# Patient Record
Sex: MALE | Race: ASIAN | NOT HISPANIC OR LATINO
[De-identification: names, ages, dates, MRNs, and addresses within clinical notes are randomized per-mention and may not be internally consistent; named-entity substitution may affect disease eponyms.]

---

## 2017-05-12 ENCOUNTER — NON-APPOINTMENT (OUTPATIENT)
Age: 72
End: 2017-05-12

## 2017-05-12 ENCOUNTER — APPOINTMENT (OUTPATIENT)
Dept: CARDIOLOGY | Facility: CLINIC | Age: 72
End: 2017-05-12

## 2017-05-12 VITALS
SYSTOLIC BLOOD PRESSURE: 135 MMHG | RESPIRATION RATE: 18 BRPM | HEART RATE: 68 BPM | TEMPERATURE: 98 F | OXYGEN SATURATION: 97 % | WEIGHT: 200 LBS | DIASTOLIC BLOOD PRESSURE: 87 MMHG | BODY MASS INDEX: 31.32 KG/M2

## 2017-05-12 VITALS — DIASTOLIC BLOOD PRESSURE: 90 MMHG | SYSTOLIC BLOOD PRESSURE: 136 MMHG

## 2017-11-20 ENCOUNTER — NON-APPOINTMENT (OUTPATIENT)
Age: 72
End: 2017-11-20

## 2017-11-20 ENCOUNTER — APPOINTMENT (OUTPATIENT)
Dept: CARDIOLOGY | Facility: CLINIC | Age: 72
End: 2017-11-20
Payer: MEDICARE

## 2017-11-20 VITALS
OXYGEN SATURATION: 95 % | WEIGHT: 200 LBS | RESPIRATION RATE: 18 BRPM | HEART RATE: 93 BPM | BODY MASS INDEX: 31.32 KG/M2 | SYSTOLIC BLOOD PRESSURE: 134 MMHG | TEMPERATURE: 98.2 F | DIASTOLIC BLOOD PRESSURE: 78 MMHG

## 2017-11-20 VITALS — SYSTOLIC BLOOD PRESSURE: 127 MMHG | DIASTOLIC BLOOD PRESSURE: 80 MMHG

## 2017-11-20 PROCEDURE — 99214 OFFICE O/P EST MOD 30 MIN: CPT

## 2017-11-20 PROCEDURE — 93000 ELECTROCARDIOGRAM COMPLETE: CPT | Mod: 59

## 2017-11-20 RX ORDER — RAMELTEON 8 MG/1
8 TABLET, FILM COATED ORAL
Qty: 30 | Refills: 0 | Status: ACTIVE | COMMUNITY
Start: 2017-08-08

## 2017-11-20 RX ORDER — ALFUZOSIN HYDROCHLORIDE 10 MG/1
10 TABLET, EXTENDED RELEASE ORAL
Qty: 90 | Refills: 0 | Status: ACTIVE | COMMUNITY
Start: 2017-08-07

## 2018-05-21 ENCOUNTER — APPOINTMENT (OUTPATIENT)
Dept: CARDIOLOGY | Facility: CLINIC | Age: 73
End: 2018-05-21
Payer: MEDICARE

## 2018-05-21 VITALS
SYSTOLIC BLOOD PRESSURE: 124 MMHG | TEMPERATURE: 98.9 F | DIASTOLIC BLOOD PRESSURE: 70 MMHG | OXYGEN SATURATION: 96 % | HEART RATE: 96 BPM | BODY MASS INDEX: 30.07 KG/M2 | WEIGHT: 192 LBS | RESPIRATION RATE: 17 BRPM

## 2018-05-21 PROCEDURE — 99214 OFFICE O/P EST MOD 30 MIN: CPT

## 2018-05-21 PROCEDURE — 93000 ELECTROCARDIOGRAM COMPLETE: CPT | Mod: 59

## 2018-11-19 ENCOUNTER — NON-APPOINTMENT (OUTPATIENT)
Age: 73
End: 2018-11-19

## 2018-11-19 ENCOUNTER — APPOINTMENT (OUTPATIENT)
Dept: CARDIOLOGY | Facility: CLINIC | Age: 73
End: 2018-11-19
Payer: MEDICARE

## 2018-11-19 VITALS
TEMPERATURE: 97.7 F | RESPIRATION RATE: 18 BRPM | WEIGHT: 204 LBS | OXYGEN SATURATION: 96 % | BODY MASS INDEX: 31.95 KG/M2 | DIASTOLIC BLOOD PRESSURE: 70 MMHG | SYSTOLIC BLOOD PRESSURE: 120 MMHG | HEART RATE: 75 BPM

## 2018-11-19 PROCEDURE — 93000 ELECTROCARDIOGRAM COMPLETE: CPT | Mod: 59

## 2018-11-19 PROCEDURE — 99214 OFFICE O/P EST MOD 30 MIN: CPT

## 2018-11-19 RX ORDER — VENLAFAXINE HYDROCHLORIDE 150 MG/1
150 CAPSULE, EXTENDED RELEASE ORAL
Qty: 28 | Refills: 0 | Status: ACTIVE | COMMUNITY
Start: 2018-07-26

## 2018-11-19 NOTE — HISTORY OF PRESENT ILLNESS
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 He checked his  BP and heart rate ( pulse rate) were WNL. He has no new complaints.\par He was told to see me after PMD examined him and did ECG not long ago.

## 2018-11-19 NOTE — REVIEW OF SYSTEMS
[Negative] : Heme/Lymph [Fever] : no fever [Headache] : no headache [Chills] : no chills [Feeling Fatigued] : not feeling fatigued [Shortness Of Breath] : no shortness of breath [Dyspnea on exertion] : not dyspnea during exertion [Chest  Pressure] : no chest pressure [Chest Pain] : no chest pain [Lower Ext Edema] : no extremity edema [Leg Claudication] : no intermittent leg claudication [Palpitations] : no palpitations [Cough] : no cough [Wheezing] : no wheezing [Abdominal Pain] : no abdominal pain [Nausea] : no nausea [Vomiting] : no vomiting [Heartburn] : no heartburn [Change in Appetite] : no change in appetite [Change In The Stool] : no change in stool [Dysphagia] : no dysphagia [Joint Pain] : no joint pain [Muscle Cramps] : no muscle cramps [Skin: A Rash] : no rash: [Skin Lesions] : no skin lesions [Dizziness] : no dizziness [Tremor] : no tremor was seen [Convulsions] : no convulsions [Excessive Thirst] : no polydipsia [Easy Bleeding] : no tendency for easy bleeding [Easy Bruising] : no tendency for easy bruising

## 2018-11-19 NOTE — REASON FOR VISIT
[Follow-Up - Clinic] : a clinic follow-up of [Abnormal ECG] : an abnormal ECG [Coronary Artery Disease] : coronary artery disease [Hyperlipidemia] : hyperlipidemia [Hypertension] : hypertension [Medication Management] : Medication management [Syncope] : syncope

## 2018-11-19 NOTE — PHYSICAL EXAM
[General Appearance - Well Developed] : well developed [Normal Appearance] : normal appearance [Well Groomed] : well groomed [General Appearance - Well Nourished] : well nourished [No Deformities] : no deformities [General Appearance - In No Acute Distress] : no acute distress [Normal Conjunctiva] : the conjunctiva exhibited no abnormalities [Eyelids - No Xanthelasma] : the eyelids demonstrated no xanthelasmas [Normal Oral Mucosa] : normal oral mucosa [No Oral Pallor] : no oral pallor [No Oral Cyanosis] : no oral cyanosis [Normal Jugular Venous A Waves Present] : normal jugular venous A waves present [Normal Jugular Venous V Waves Present] : normal jugular venous V waves present [No Jugular Venous Jimenez A Waves] : no jugular venous jimenez A waves [Respiration, Rhythm And Depth] : normal respiratory rhythm and effort [Exaggerated Use Of Accessory Muscles For Inspiration] : no accessory muscle use [Auscultation Breath Sounds / Voice Sounds] : lungs were clear to auscultation bilaterally [Chest Palpation] : palpation of the chest revealed no abnormalities [Lungs Percussion] : the lungs were normal to percussion [Heart Rate And Rhythm] : heart rate and rhythm were normal [Heart Sounds] : normal S1 and S2 [Murmurs] : no murmurs present [Arterial Pulses Normal] : the arterial pulses were normal [Edema] : no peripheral edema present [Veins - Varicosity Changes] : no varicosital changes were noted in the lower extremities [Bowel Sounds] : normal bowel sounds [Abdomen Soft] : soft [Abdomen Tenderness] : non-tender [Abdomen Mass (___ Cm)] : no abdominal mass palpated [Abdomen Hernia] : no hernia was discovered [Abnormal Walk] : normal gait [Gait - Sufficient For Exercise Testing] : the gait was sufficient for exercise testing [Nail Clubbing] : no clubbing of the fingernails [Cyanosis, Localized] : no localized cyanosis [Petechial Hemorrhages (___cm)] : no petechial hemorrhages [Skin Color & Pigmentation] : normal skin color and pigmentation [] : no rash [No Venous Stasis] : no venous stasis [Skin Lesions] : no skin lesions [No Skin Ulcers] : no skin ulcer [No Xanthoma] : no  xanthoma was observed [Oriented To Time, Place, And Person] : oriented to person, place, and time [Affect] : the affect was normal [Mood] : the mood was normal [No Anxiety] : not feeling anxious none [FreeTextEntry1] : at right groin area there is puncture wound scar with evidence of angioseal bump. no abnormality.

## 2018-11-19 NOTE — DISCUSSION/SUMMARY
[Coronary Artery Disease] : coronary artery disease [Medication Changes Per Orders] : as documented in orders [Dietary Modification] : dietary modification [Hyperlipidemia] : hyperlipidemia [Diet Modification] : diet modification [Hypertension] : hypertension [Stable] : stable [None] : none [Smoking Cessation] : smoking cessation [Sodium Restriction] : sodium restriction [Patient] : the patient [___ Month(s)] : [unfilled] month(s) [With Me] : with me [de-identified] : s/p PCI [de-identified] : start the plavix to replace the  ASA. [FreeTextEntry1] : lipid f/u is done by the primary doctor's blood test.\par

## 2019-05-20 ENCOUNTER — APPOINTMENT (OUTPATIENT)
Dept: CARDIOLOGY | Facility: CLINIC | Age: 74
End: 2019-05-20

## 2019-07-26 ENCOUNTER — APPOINTMENT (OUTPATIENT)
Dept: CARDIOLOGY | Facility: CLINIC | Age: 74
End: 2019-07-26
Payer: MEDICARE

## 2019-07-26 ENCOUNTER — NON-APPOINTMENT (OUTPATIENT)
Age: 74
End: 2019-07-26

## 2019-07-26 VITALS
RESPIRATION RATE: 18 BRPM | HEART RATE: 79 BPM | WEIGHT: 197 LBS | DIASTOLIC BLOOD PRESSURE: 67 MMHG | OXYGEN SATURATION: 96 % | TEMPERATURE: 97.9 F | BODY MASS INDEX: 30.85 KG/M2 | SYSTOLIC BLOOD PRESSURE: 107 MMHG

## 2019-07-26 PROCEDURE — 99214 OFFICE O/P EST MOD 30 MIN: CPT

## 2019-07-26 PROCEDURE — 93000 ELECTROCARDIOGRAM COMPLETE: CPT | Mod: 59

## 2019-07-26 RX ORDER — OFLOXACIN 3 MG/ML
0.3 SOLUTION/ DROPS OPHTHALMIC
Qty: 5 | Refills: 0 | Status: ACTIVE | COMMUNITY
Start: 2019-07-22

## 2019-07-26 RX ORDER — IBUPROFEN 600 MG/1
600 TABLET, FILM COATED ORAL
Qty: 28 | Refills: 0 | Status: DISCONTINUED | COMMUNITY
Start: 2018-07-09 | End: 2019-07-26

## 2019-07-26 RX ORDER — VENLAFAXINE HYDROCHLORIDE 75 MG/1
75 CAPSULE, EXTENDED RELEASE ORAL
Qty: 30 | Refills: 0 | Status: DISCONTINUED | COMMUNITY
Start: 2018-06-21 | End: 2019-07-26

## 2019-07-26 RX ORDER — AMOXICILLIN 500 MG/1
500 CAPSULE ORAL
Qty: 27 | Refills: 0 | Status: DISCONTINUED | COMMUNITY
Start: 2018-07-09 | End: 2019-07-26

## 2019-07-26 NOTE — DISCUSSION/SUMMARY
[Coronary Artery Disease] : coronary artery disease [Medication Changes Per Orders] : as documented in orders [Dietary Modification] : dietary modification [Hyperlipidemia] : hyperlipidemia [Diet Modification] : diet modification [Hypertension] : hypertension [Stable] : stable [None] : none [Smoking Cessation] : smoking cessation [Sodium Restriction] : sodium restriction [Patient] : the patient [___ Month(s)] : [unfilled] month(s) [With Me] : with me [de-identified] : s/p PCI [de-identified] : start the plavix to replace the  ASA. [FreeTextEntry1] : lipid f/u is done by the primary doctor's blood test.\par The  claudication of right thigh ( history of angioseal usage, cardiac cath puncture) is discussed and referred to vascular interventionist.\par

## 2019-07-26 NOTE — PHYSICAL EXAM
[Normal Appearance] : normal appearance [General Appearance - Well Developed] : well developed [Well Groomed] : well groomed [General Appearance - Well Nourished] : well nourished [No Deformities] : no deformities [General Appearance - In No Acute Distress] : no acute distress [Normal Conjunctiva] : the conjunctiva exhibited no abnormalities [Eyelids - No Xanthelasma] : the eyelids demonstrated no xanthelasmas [Normal Oral Mucosa] : normal oral mucosa [No Oral Cyanosis] : no oral cyanosis [No Oral Pallor] : no oral pallor [Normal Jugular Venous A Waves Present] : normal jugular venous A waves present [Normal Jugular Venous V Waves Present] : normal jugular venous V waves present [Exaggerated Use Of Accessory Muscles For Inspiration] : no accessory muscle use [No Jugular Venous Jimenez A Waves] : no jugular venous jimenez A waves [Respiration, Rhythm And Depth] : normal respiratory rhythm and effort [Auscultation Breath Sounds / Voice Sounds] : lungs were clear to auscultation bilaterally [Lungs Percussion] : the lungs were normal to percussion [Chest Palpation] : palpation of the chest revealed no abnormalities [Heart Rate And Rhythm] : heart rate and rhythm were normal [Heart Sounds] : normal S1 and S2 [Murmurs] : no murmurs present [Arterial Pulses Normal] : the arterial pulses were normal [Edema] : no peripheral edema present [Veins - Varicosity Changes] : no varicosital changes were noted in the lower extremities [Bowel Sounds] : normal bowel sounds [Abdomen Soft] : soft [Abdomen Tenderness] : non-tender [Abdomen Mass (___ Cm)] : no abdominal mass palpated [Abdomen Hernia] : no hernia was discovered [Abnormal Walk] : normal gait [Gait - Sufficient For Exercise Testing] : the gait was sufficient for exercise testing [Nail Clubbing] : no clubbing of the fingernails [Cyanosis, Localized] : no localized cyanosis [Petechial Hemorrhages (___cm)] : no petechial hemorrhages [Skin Color & Pigmentation] : normal skin color and pigmentation [No Venous Stasis] : no venous stasis [] : no rash [Skin Lesions] : no skin lesions [No Skin Ulcers] : no skin ulcer [Oriented To Time, Place, And Person] : oriented to person, place, and time [No Xanthoma] : no  xanthoma was observed [Affect] : the affect was normal [Mood] : the mood was normal [No Anxiety] : not feeling anxious [FreeTextEntry1] :  the distal peripheral pulses of lower extremities is presented and 3+, equal.

## 2019-07-26 NOTE — REASON FOR VISIT
[Follow-Up - Clinic] : a clinic follow-up of [Abnormal ECG] : an abnormal ECG [Hyperlipidemia] : hyperlipidemia [Coronary Artery Disease] : coronary artery disease [Medication Management] : Medication management [Hypertension] : hypertension [Syncope] : syncope

## 2019-07-26 NOTE — REVIEW OF SYSTEMS
[Negative] : Heme/Lymph [Fever] : no fever [Headache] : no headache [Chills] : no chills [Feeling Fatigued] : not feeling fatigued [Shortness Of Breath] : no shortness of breath [Dyspnea on exertion] : not dyspnea during exertion [Chest Pain] : no chest pain [Chest  Pressure] : no chest pressure [Lower Ext Edema] : no extremity edema [Leg Claudication] : intermittent leg claudication [Palpitations] : no palpitations [Cough] : no cough [Wheezing] : no wheezing [Abdominal Pain] : no abdominal pain [Nausea] : no nausea [Vomiting] : no vomiting [Heartburn] : no heartburn [Change in Appetite] : no change in appetite [Change In The Stool] : no change in stool [Dysphagia] : no dysphagia [Joint Pain] : no joint pain [Muscle Cramps] : no muscle cramps [Skin: A Rash] : no rash: [Skin Lesions] : no skin lesions [Dizziness] : no dizziness [Tremor] : no tremor was seen [Convulsions] : no convulsions [Excessive Thirst] : no polydipsia [Easy Bleeding] : no tendency for easy bleeding [Easy Bruising] : no tendency for easy bruising

## 2019-07-26 NOTE — HISTORY OF PRESENT ILLNESS
[FreeTextEntry1] : Patient is doing well with current medical regimens.\par He is  free of chest pain, shortness of breath, dizziness  or palpitations, but no intention to  exercise.\par He reminds me that he had couple times of fatal GI bleeding from ulcer by ASA.\par  He checked his  BP and heart rate ( pulse rate) were WNL. He has no new complaints.\par He has  ambulation induced right  thigh numbness and aching ( 4 blocks walking will  initiate the symptoms).

## 2020-01-31 ENCOUNTER — APPOINTMENT (OUTPATIENT)
Dept: CARDIOLOGY | Facility: CLINIC | Age: 75
End: 2020-01-31

## 2020-02-11 ENCOUNTER — NON-APPOINTMENT (OUTPATIENT)
Age: 75
End: 2020-02-11

## 2020-02-11 ENCOUNTER — APPOINTMENT (OUTPATIENT)
Dept: CARDIOLOGY | Facility: CLINIC | Age: 75
End: 2020-02-11
Payer: MEDICARE

## 2020-02-11 VITALS
WEIGHT: 195 LBS | RESPIRATION RATE: 17 BRPM | DIASTOLIC BLOOD PRESSURE: 76 MMHG | HEART RATE: 81 BPM | TEMPERATURE: 98.5 F | BODY MASS INDEX: 30.54 KG/M2 | OXYGEN SATURATION: 95 % | SYSTOLIC BLOOD PRESSURE: 117 MMHG

## 2020-02-11 PROCEDURE — 93306 TTE W/DOPPLER COMPLETE: CPT

## 2020-02-11 PROCEDURE — 93000 ELECTROCARDIOGRAM COMPLETE: CPT | Mod: 59

## 2020-02-11 PROCEDURE — 99214 OFFICE O/P EST MOD 30 MIN: CPT

## 2020-02-11 NOTE — PHYSICAL EXAM
[General Appearance - Well Developed] : well developed [Well Groomed] : well groomed [Normal Appearance] : normal appearance [No Deformities] : no deformities [General Appearance - In No Acute Distress] : no acute distress [General Appearance - Well Nourished] : well nourished [Normal Conjunctiva] : the conjunctiva exhibited no abnormalities [Eyelids - No Xanthelasma] : the eyelids demonstrated no xanthelasmas [No Oral Pallor] : no oral pallor [No Oral Cyanosis] : no oral cyanosis [Normal Jugular Venous A Waves Present] : normal jugular venous A waves present [Normal Oral Mucosa] : normal oral mucosa [Normal Jugular Venous V Waves Present] : normal jugular venous V waves present [No Jugular Venous Jimenez A Waves] : no jugular venous jimenez A waves [Respiration, Rhythm And Depth] : normal respiratory rhythm and effort [Exaggerated Use Of Accessory Muscles For Inspiration] : no accessory muscle use [Auscultation Breath Sounds / Voice Sounds] : lungs were clear to auscultation bilaterally [Chest Palpation] : palpation of the chest revealed no abnormalities [Heart Sounds] : normal S1 and S2 [Lungs Percussion] : the lungs were normal to percussion [Heart Rate And Rhythm] : heart rate and rhythm were normal [Arterial Pulses Normal] : the arterial pulses were normal [Edema] : no peripheral edema present [Murmurs] : no murmurs present [Bowel Sounds] : normal bowel sounds [Veins - Varicosity Changes] : no varicosital changes were noted in the lower extremities [Abdomen Mass (___ Cm)] : no abdominal mass palpated [Abdomen Tenderness] : non-tender [Abdomen Soft] : soft [Abdomen Hernia] : no hernia was discovered [Abnormal Walk] : normal gait [Nail Clubbing] : no clubbing of the fingernails [Cyanosis, Localized] : no localized cyanosis [Gait - Sufficient For Exercise Testing] : the gait was sufficient for exercise testing [Petechial Hemorrhages (___cm)] : no petechial hemorrhages [No Venous Stasis] : no venous stasis [] : no rash [No Skin Ulcers] : no skin ulcer [No Xanthoma] : no  xanthoma was observed [Affect] : the affect was normal [Oriented To Time, Place, And Person] : oriented to person, place, and time [Mood] : the mood was normal [No Anxiety] : not feeling anxious [FreeTextEntry1] :  the distal peripheral pulses of lower extremities is presented and 3+, equal. [Skin Turgor] : normal skin turgor

## 2020-02-11 NOTE — REVIEW OF SYSTEMS
[Leg Claudication] : intermittent leg claudication [Negative] : Heme/Lymph [Fever] : no fever [Headache] : no headache [Feeling Fatigued] : not feeling fatigued [Chills] : no chills [Shortness Of Breath] : no shortness of breath [Dyspnea on exertion] : not dyspnea during exertion [Chest Pain] : no chest pain [Chest  Pressure] : no chest pressure [Lower Ext Edema] : no extremity edema [Palpitations] : no palpitations [Cough] : no cough [Wheezing] : no wheezing [Vomiting] : no vomiting [Abdominal Pain] : no abdominal pain [Nausea] : no nausea [Heartburn] : no heartburn [Change In The Stool] : no change in stool [Change in Appetite] : no change in appetite [Dysphagia] : no dysphagia [Muscle Cramps] : no muscle cramps [Joint Pain] : no joint pain [Skin Lesions] : no skin lesions [Skin: A Rash] : no rash: [Dizziness] : no dizziness [Tremor] : no tremor was seen [Convulsions] : no convulsions [Easy Bleeding] : no tendency for easy bleeding [Excessive Thirst] : no polydipsia [Easy Bruising] : no tendency for easy bruising

## 2020-02-11 NOTE — HISTORY OF PRESENT ILLNESS
[FreeTextEntry1] : Patient is doing well with current medical regimens.\par He is  free of chest pain, shortness of breath, dizziness  or palpitations, but no intention to  exercise.\par He reminds me that he had couple times of fatal GI bleeding from ulcer by ASA.\par  He checked his  BP and heart rate ( pulse rate) were WNL. He has no new complaints.\par He has  ambulation induced right  thigh numbness and aching ( 4 blocks walking will  initiate the symptoms).\par He has been with " flu like syndrome" for the last one month.

## 2020-02-11 NOTE — DISCUSSION/SUMMARY
[Coronary Artery Disease] : coronary artery disease [Dietary Modification] : dietary modification [Medication Changes Per Orders] : as documented in orders [Hyperlipidemia] : hyperlipidemia [Diet Modification] : diet modification [Hypertension] : hypertension [Stable] : stable [None] : none [Smoking Cessation] : smoking cessation [___ Month(s)] : [unfilled] month(s) [Sodium Restriction] : sodium restriction [Patient] : the patient [With Me] : with me [de-identified] : start the plavix to replace the  ASA. [de-identified] : s/p PCI [FreeTextEntry1] : lipid f/u is done by the primary doctor's blood test.\par The  claudication of right thigh ( history of angioseal usage, cardiac cath puncture) is discussed and referred to vascular interventionist.\par

## 2020-02-26 ENCOUNTER — APPOINTMENT (OUTPATIENT)
Dept: CARDIOLOGY | Facility: CLINIC | Age: 75
End: 2020-02-26
Payer: MEDICARE

## 2020-02-26 VITALS
BODY MASS INDEX: 29.92 KG/M2 | WEIGHT: 191 LBS | OXYGEN SATURATION: 95 % | TEMPERATURE: 97.9 F | SYSTOLIC BLOOD PRESSURE: 130 MMHG | HEART RATE: 81 BPM | DIASTOLIC BLOOD PRESSURE: 73 MMHG | RESPIRATION RATE: 17 BRPM

## 2020-02-26 PROCEDURE — 93922 UPR/L XTREMITY ART 2 LEVELS: CPT

## 2020-02-26 PROCEDURE — 99204 OFFICE O/P NEW MOD 45 MIN: CPT

## 2020-02-26 NOTE — HISTORY OF PRESENT ILLNESS
[FreeTextEntry1] : 73 yo man HTN, HC, smoking, CAD s/p PCI in 2011 referred by Dr. Murphy for evaluation of right leg pain.  The patient reports 2 year h/o intermittent right lateral thigh pain and "numbness" after one admission for severe back pain.  The symptom is usually brought on by walking 100-200 meters, especially carrying weight, or standing for more than 30 min.  It is relieved by resting, especially sitting down.  No resting pain, ulcer or gangrene.

## 2020-02-26 NOTE — REVIEW OF SYSTEMS
[Leg Claudication] : intermittent leg claudication [Fever] : no fever [Chills] : no chills [Blurry Vision] : no blurred vision [Earache] : no earache [Chest Pain] : no chest pain [Shortness Of Breath] : no shortness of breath [Abdominal Pain] : no abdominal pain [Cough] : no cough [Skin: A Rash] : no rash: [Urinary Frequency] : no change in urinary frequency [Confusion] : no confusion was observed [Dizziness] : no dizziness [Easy Bleeding] : no tendency for easy bleeding [Excessive Thirst] : no polydipsia

## 2020-02-26 NOTE — ASSESSMENT
[FreeTextEntry1] : 1. Right leg pain: likely pseudo-claudication due to spinal stenosis\par 2. CAD: s/p PCI without active angina.\par 3. HTN: controlled.\par 4. HC: on statin.\par 5. Smoking: still somewhat active.\par \par Plan:\par 1. Consider neurological or neurosurgical evaluation.\par 2. C/w antiplatelet and statin therapy.\par 3. Smoking cessation.\par 4. RTC PRN.

## 2020-02-26 NOTE — PHYSICAL EXAM
[Normal Conjunctiva] : the conjunctiva exhibited no abnormalities [General Appearance - Well Developed] : well developed [General Appearance - Well Nourished] : well nourished [JVD Elevated _____cm] : JVD elevated [unfilled] ~U cm above clavicle [Normal Oral Mucosa] : normal oral mucosa [5th Left ICS - MCL] : palpated at the 5th LICS in the midclavicular line [Normal] : normal [Normal S1] : normal S1 [Rhythm Regular] : regular [Normal Rate] : normal [Normal S2] : normal S2 [No Murmur] : no murmurs heard [No Abnormalities] : the abdominal aorta was not enlarged and no bruit was heard [2+] : left 2+ [No Pitting Edema] : no pitting edema present [Respiration, Rhythm And Depth] : normal respiratory rhythm and effort [Abdomen Soft] : soft [Auscultation Breath Sounds / Voice Sounds] : lungs were clear to auscultation bilaterally [Abnormal Walk] : normal gait [Nail Clubbing] : no clubbing of the fingernails [Abdomen Tenderness] : non-tender [Cyanosis, Localized] : no localized cyanosis [Skin Color & Pigmentation] : normal skin color and pigmentation [Oriented To Time, Place, And Person] : oriented to person, place, and time [S4] : no S4 [S3] : no S3 [Right Carotid Bruit] : no bruit heard over the right carotid [Left Carotid Bruit] : no bruit heard over the left carotid [Right Femoral Bruit] : no bruit heard over the right femoral artery [Left Femoral Bruit] : no bruit heard over the left femoral artery [Lt] : no varicose veins of the left leg [Rt] : no varicose veins of the right leg

## 2020-08-11 ENCOUNTER — NON-APPOINTMENT (OUTPATIENT)
Age: 75
End: 2020-08-11

## 2020-08-11 ENCOUNTER — APPOINTMENT (OUTPATIENT)
Dept: CARDIOLOGY | Facility: CLINIC | Age: 75
End: 2020-08-11
Payer: MEDICARE

## 2020-08-11 VITALS
TEMPERATURE: 97.7 F | HEART RATE: 84 BPM | WEIGHT: 180 LBS | OXYGEN SATURATION: 96 % | RESPIRATION RATE: 17 BRPM | SYSTOLIC BLOOD PRESSURE: 111 MMHG | DIASTOLIC BLOOD PRESSURE: 72 MMHG | BODY MASS INDEX: 28.19 KG/M2

## 2020-08-11 PROCEDURE — 99214 OFFICE O/P EST MOD 30 MIN: CPT

## 2020-08-11 PROCEDURE — 93000 ELECTROCARDIOGRAM COMPLETE: CPT | Mod: 59

## 2020-08-11 NOTE — DISCUSSION/SUMMARY
[Coronary Artery Disease] : coronary artery disease [Medication Changes Per Orders] : as documented in orders [Dietary Modification] : dietary modification [Hyperlipidemia] : hyperlipidemia [Diet Modification] : diet modification [Hypertension] : hypertension [Stable] : stable [None] : none [Responding to Treatment] : responding to treatment [Smoking Cessation] : smoking cessation [Sodium Restriction] : sodium restriction [___ Month(s)] : [unfilled] month(s) [Patient] : the patient [With Me] : with me [de-identified] : s/p PCI [de-identified] : start the plavix to replace the  ASA. [de-identified] : He has lost 11 Ibs [FreeTextEntry1] : \par

## 2020-08-11 NOTE — HISTORY OF PRESENT ILLNESS
[FreeTextEntry1] : Patient is doing well with current medical regimens.\par He is  free of chest pain, shortness of breath, dizziness  or palpitations, but no intention to  exercise.\par He reminds me that he had couple times of fatal GI bleeding from ulcer by ASA.\par  He checked his  BP and heart rate ( pulse rate) were WNL. He has no new complaints.\par During the  COVID pandemic, he has no event, no manifestations.\par

## 2020-08-11 NOTE — REVIEW OF SYSTEMS
[Fever] : no fever [Headache] : no headache [Chills] : no chills [Feeling Fatigued] : not feeling fatigued [Shortness Of Breath] : no shortness of breath [Dyspnea on exertion] : not dyspnea during exertion [Chest  Pressure] : no chest pressure [Chest Pain] : no chest pain [Lower Ext Edema] : no extremity edema [Leg Claudication] : intermittent leg claudication [Cough] : no cough [Palpitations] : no palpitations [Wheezing] : no wheezing [Abdominal Pain] : no abdominal pain [Nausea] : no nausea [Heartburn] : no heartburn [Vomiting] : no vomiting [Change In The Stool] : no change in stool [Change in Appetite] : no change in appetite [Joint Pain] : no joint pain [Dysphagia] : no dysphagia [Muscle Cramps] : no muscle cramps [Skin: A Rash] : no rash: [Skin Lesions] : no skin lesions [Dizziness] : no dizziness [Convulsions] : no convulsions [Tremor] : no tremor was seen [Easy Bleeding] : no tendency for easy bleeding [Excessive Thirst] : no polydipsia [Easy Bruising] : no tendency for easy bruising [Negative] : Heme/Lymph

## 2020-08-11 NOTE — PHYSICAL EXAM
[General Appearance - Well Developed] : well developed [Normal Appearance] : normal appearance [General Appearance - Well Nourished] : well nourished [Well Groomed] : well groomed [No Deformities] : no deformities [General Appearance - In No Acute Distress] : no acute distress [Eyelids - No Xanthelasma] : the eyelids demonstrated no xanthelasmas [Normal Conjunctiva] : the conjunctiva exhibited no abnormalities [No Oral Cyanosis] : no oral cyanosis [No Oral Pallor] : no oral pallor [Normal Oral Mucosa] : normal oral mucosa [Normal Jugular Venous A Waves Present] : normal jugular venous A waves present [Normal Jugular Venous V Waves Present] : normal jugular venous V waves present [No Jugular Venous Jimenez A Waves] : no jugular venous jimenez A waves [Exaggerated Use Of Accessory Muscles For Inspiration] : no accessory muscle use [Respiration, Rhythm And Depth] : normal respiratory rhythm and effort [Chest Palpation] : palpation of the chest revealed no abnormalities [Lungs Percussion] : the lungs were normal to percussion [Auscultation Breath Sounds / Voice Sounds] : lungs were clear to auscultation bilaterally [Heart Rate And Rhythm] : heart rate and rhythm were normal [Murmurs] : no murmurs present [Heart Sounds] : normal S1 and S2 [Arterial Pulses Normal] : the arterial pulses were normal [Edema] : no peripheral edema present [Veins - Varicosity Changes] : no varicosital changes were noted in the lower extremities [Bowel Sounds] : normal bowel sounds [Abdomen Tenderness] : non-tender [Abdomen Soft] : soft [Abdomen Hernia] : no hernia was discovered [Abdomen Mass (___ Cm)] : no abdominal mass palpated [Gait - Sufficient For Exercise Testing] : the gait was sufficient for exercise testing [Abnormal Walk] : normal gait [Nail Clubbing] : no clubbing of the fingernails [Petechial Hemorrhages (___cm)] : no petechial hemorrhages [Cyanosis, Localized] : no localized cyanosis [Skin Turgor] : normal skin turgor [FreeTextEntry1] :  the distal peripheral pulses of lower extremities is presented and 3+, equal. [No Venous Stasis] : no venous stasis [] : no rash [No Skin Ulcers] : no skin ulcer [No Xanthoma] : no  xanthoma was observed [Oriented To Time, Place, And Person] : oriented to person, place, and time [Mood] : the mood was normal [Affect] : the affect was normal [No Anxiety] : not feeling anxious

## 2021-02-09 ENCOUNTER — APPOINTMENT (OUTPATIENT)
Dept: CARDIOLOGY | Facility: CLINIC | Age: 76
End: 2021-02-09
Payer: MEDICARE

## 2021-02-09 ENCOUNTER — NON-APPOINTMENT (OUTPATIENT)
Age: 76
End: 2021-02-09

## 2021-02-09 VITALS
TEMPERATURE: 97.1 F | SYSTOLIC BLOOD PRESSURE: 138 MMHG | BODY MASS INDEX: 28.19 KG/M2 | HEART RATE: 72 BPM | OXYGEN SATURATION: 94 % | WEIGHT: 180 LBS | DIASTOLIC BLOOD PRESSURE: 70 MMHG | RESPIRATION RATE: 18 BRPM

## 2021-02-09 PROCEDURE — 93000 ELECTROCARDIOGRAM COMPLETE: CPT | Mod: 59

## 2021-02-09 PROCEDURE — 99214 OFFICE O/P EST MOD 30 MIN: CPT

## 2021-02-09 PROCEDURE — 99072 ADDL SUPL MATRL&STAF TM PHE: CPT

## 2021-02-09 RX ORDER — CLONAZEPAM 0.5 MG/1
0.5 TABLET ORAL
Qty: 20 | Refills: 0 | Status: ACTIVE | COMMUNITY
Start: 2020-12-20

## 2021-02-09 NOTE — REVIEW OF SYSTEMS
[Leg Claudication] : intermittent leg claudication [Negative] : Heme/Lymph [Fever] : no fever [Headache] : no headache [Chills] : no chills [Feeling Fatigued] : not feeling fatigued [Shortness Of Breath] : no shortness of breath [Dyspnea on exertion] : not dyspnea during exertion [Chest  Pressure] : no chest pressure [Chest Pain] : no chest pain [Lower Ext Edema] : no extremity edema [Palpitations] : no palpitations [Cough] : no cough [Wheezing] : no wheezing [Abdominal Pain] : no abdominal pain [Nausea] : no nausea [Vomiting] : no vomiting [Heartburn] : no heartburn [Change in Appetite] : no change in appetite [Change In The Stool] : no change in stool [Dysphagia] : no dysphagia [Joint Pain] : no joint pain [Joint Swelling] : no joint swelling [Joint Stiffness] : no joint stiffness [Muscle Cramps] : no muscle cramps [Limb Weakness (Paresis)] : no limb weakness [Skin: A Rash] : no rash: [Itching] : no itching [Change In Color Of Skin] : change in skin color [Skin Lesions] : no skin lesions [Dizziness] : no dizziness [Tremor] : no tremor was seen [Numbness (Hypesthesia)] : no numbness [Convulsions] : no convulsions [Tingling (Paresthesia)] : no tingling [Excessive Thirst] : no polydipsia [Easy Bleeding] : no tendency for easy bleeding [Swollen Glands] : no swollen glands [Easy Bruising] : no tendency for easy bruising [Swollen Glands In The Neck] : no swollen glands in the neck

## 2021-02-09 NOTE — DISCUSSION/SUMMARY
[Coronary Artery Disease] : coronary artery disease [Medication Changes Per Orders] : as documented in orders [Dietary Modification] : dietary modification [Hyperlipidemia] : hyperlipidemia [Diet Modification] : diet modification [Hypertension] : hypertension [Stable] : stable [Responding to Treatment] : responding to treatment [None] : none [Smoking Cessation] : smoking cessation [Sodium Restriction] : sodium restriction [Patient] : the patient [___ Month(s)] : [unfilled] month(s) [With Me] : with me [de-identified] : s/p PCI [de-identified] : start the plavix to replace the  ASA. [de-identified] : He has lost 11 Ibs [FreeTextEntry1] : \par

## 2021-02-09 NOTE — PHYSICAL EXAM
[General Appearance - Well Developed] : well developed [Normal Appearance] : normal appearance [Well Groomed] : well groomed [General Appearance - Well Nourished] : well nourished [No Deformities] : no deformities [General Appearance - In No Acute Distress] : no acute distress [Normal Conjunctiva] : the conjunctiva exhibited no abnormalities [Eyelids - No Xanthelasma] : the eyelids demonstrated no xanthelasmas [Normal Oral Mucosa] : normal oral mucosa [No Oral Pallor] : no oral pallor [No Oral Cyanosis] : no oral cyanosis [Normal Jugular Venous A Waves Present] : normal jugular venous A waves present [Normal Jugular Venous V Waves Present] : normal jugular venous V waves present [No Jugular Venous Jimenez A Waves] : no jugular venous jimenez A waves [Respiration, Rhythm And Depth] : normal respiratory rhythm and effort [Exaggerated Use Of Accessory Muscles For Inspiration] : no accessory muscle use [Auscultation Breath Sounds / Voice Sounds] : lungs were clear to auscultation bilaterally [Chest Palpation] : palpation of the chest revealed no abnormalities [Lungs Percussion] : the lungs were normal to percussion [Heart Rate And Rhythm] : heart rate and rhythm were normal [Heart Sounds] : normal S1 and S2 [Murmurs] : no murmurs present [Arterial Pulses Normal] : the arterial pulses were normal [Veins - Varicosity Changes] : no varicosital changes were noted in the lower extremities [Edema] : no peripheral edema present [Bowel Sounds] : normal bowel sounds [Abdomen Soft] : soft [Abdomen Tenderness] : non-tender [Abdomen Mass (___ Cm)] : no abdominal mass palpated [Abdomen Hernia] : no hernia was discovered [Abnormal Walk] : normal gait [Gait - Sufficient For Exercise Testing] : the gait was sufficient for exercise testing [Nail Clubbing] : no clubbing of the fingernails [Petechial Hemorrhages (___cm)] : no petechial hemorrhages [Cyanosis, Localized] : no localized cyanosis [Skin Turgor] : normal skin turgor [] : no rash [No Venous Stasis] : no venous stasis [No Skin Ulcers] : no skin ulcer [No Xanthoma] : no  xanthoma was observed [Oriented To Time, Place, And Person] : oriented to person, place, and time [Affect] : the affect was normal [Mood] : the mood was normal [No Anxiety] : not feeling anxious [FreeTextEntry1] :  the distal peripheral pulses of lower extremities is presented and 3+, equal.

## 2021-08-16 ENCOUNTER — APPOINTMENT (OUTPATIENT)
Dept: CARDIOLOGY | Facility: CLINIC | Age: 76
End: 2021-08-16
Payer: MEDICARE

## 2021-08-16 ENCOUNTER — NON-APPOINTMENT (OUTPATIENT)
Age: 76
End: 2021-08-16

## 2021-08-16 VITALS
TEMPERATURE: 97.8 F | SYSTOLIC BLOOD PRESSURE: 138 MMHG | BODY MASS INDEX: 28.35 KG/M2 | OXYGEN SATURATION: 95 % | DIASTOLIC BLOOD PRESSURE: 81 MMHG | WEIGHT: 181 LBS | RESPIRATION RATE: 18 BRPM | HEART RATE: 94 BPM

## 2021-08-16 PROCEDURE — 93000 ELECTROCARDIOGRAM COMPLETE: CPT | Mod: 59

## 2021-08-16 PROCEDURE — 99214 OFFICE O/P EST MOD 30 MIN: CPT

## 2021-08-16 NOTE — DISCUSSION/SUMMARY
[Coronary Artery Disease] : coronary artery disease [Hyperlipidemia] : hyperlipidemia [Diet Modification] : diet modification [Hypertension] : hypertension [Stable] : stable [Responding to Treatment] : responding to treatment [Smoking Cessation] : smoking cessation [With Me] : with me [___ Month(s)] : in [unfilled] month(s) [Patient] : discussed with the patient [None] : There are no changes in medication management [Exercise Regimen] : an exercise regimen [Dietary Modification] : dietary modification [Acetaminophen Avoidance] : acetaminophen  avoidance [Low Sodium Diet] : low sodium diet [NSAIDs Avoidance] : non-steroidal anti-inflammatory drugs avoidance [Minutes Spent: ___] : for [unfilled] ~Uminutes [FreeTextEntry1] : \par

## 2021-08-16 NOTE — PHYSICAL EXAM
[General Appearance - Well Developed] : well developed [Normal Appearance] : normal appearance [Well Groomed] : well groomed [General Appearance - Well Nourished] : well nourished [No Deformities] : no deformities [General Appearance - In No Acute Distress] : no acute distress [Normal Conjunctiva] : the conjunctiva exhibited no abnormalities [Eyelids - No Xanthelasma] : the eyelids demonstrated no xanthelasmas [Normal Oral Mucosa] : normal oral mucosa [No Oral Pallor] : no oral pallor [No Oral Cyanosis] : no oral cyanosis [Normal Jugular Venous A Waves Present] : normal jugular venous A waves present [Normal Jugular Venous V Waves Present] : normal jugular venous V waves present [No Jugular Venous Jimenez A Waves] : no jugular venous jimenez A waves [Respiration, Rhythm And Depth] : normal respiratory rhythm and effort [Exaggerated Use Of Accessory Muscles For Inspiration] : no accessory muscle use [Auscultation Breath Sounds / Voice Sounds] : lungs were clear to auscultation bilaterally [Chest Palpation] : palpation of the chest revealed no abnormalities [Lungs Percussion] : the lungs were normal to percussion [Heart Rate And Rhythm] : heart rate and rhythm were normal [Heart Sounds] : normal S1 and S2 [Murmurs] : no murmurs present [Arterial Pulses Normal] : the arterial pulses were normal [Edema] : no peripheral edema present [Veins - Varicosity Changes] : no varicosital changes were noted in the lower extremities [Bowel Sounds] : normal bowel sounds [Abdomen Tenderness] : non-tender [Abdomen Soft] : soft [Abdomen Mass (___ Cm)] : no abdominal mass palpated [Abdomen Hernia] : no hernia was discovered [Abnormal Walk] : normal gait [Gait - Sufficient For Exercise Testing] : the gait was sufficient for exercise testing [Nail Clubbing] : no clubbing of the fingernails [Cyanosis, Localized] : no localized cyanosis [Petechial Hemorrhages (___cm)] : no petechial hemorrhages [Skin Turgor] : normal skin turgor [] : no rash [No Venous Stasis] : no venous stasis [No Skin Ulcers] : no skin ulcer [No Xanthoma] : no  xanthoma was observed [Oriented To Time, Place, And Person] : oriented to person, place, and time [Affect] : the affect was normal [Mood] : the mood was normal [No Anxiety] : not feeling anxious [Normal Radial B/L] : normal radial B/L [Normal PT B/L] : normal PT B/L [Normal DP B/L] : normal DP B/L [FreeTextEntry1] :  the distal peripheral pulses of lower extremities is presented and 3+, equal.

## 2021-08-16 NOTE — REVIEW OF SYSTEMS
[Fever] : no fever [Headache] : no headache [Chills] : no chills [Feeling Fatigued] : not feeling fatigued [Blurry Vision] : no blurred vision [Seeing Double (Diplopia)] : no diplopia [Eye Pain] : no eye pain [Earache] : no earache [Discharge From Ears] : no discharge from the ears [Hearing Loss] : no hearing loss [Mouth Sores] : no mouth sores [Sore Throat] : no sore throat [Sinus Pressure] : no sinus pressure [Tinnitus] : no tinnitus [Vertigo] : no vertigo [SOB] : no shortness of breath [Dyspnea on exertion] : not dyspnea during exertion [Chest Discomfort] : no chest discomfort [Lower Ext Edema] : no extremity edema [Leg Claudication] : no intermittent leg claudication [Palpitations] : no palpitations [Orthopnea] : no orthopnea [PND] : no PND [Syncope] : no syncope [Cough] : no cough [Wheezing] : no wheezing [Coughing Up Blood] : no hemoptysis [Snoring] : no snoring [Abdominal Pain] : no abdominal pain [Nausea] : no nausea [Vomiting] : no vomiting [Heartburn] : no heartburn [Change in Appetite] : no change in appetite [Change In The Stool] : no change in stool [Dysphagia] : no dysphagia [Diarrhea] : diarrhea [Constipation] : no constipation [Blood in stool] : no blood in stoo [Urinary Frequency] : no change in urinary frequency [Hematuria] : no hematuria [Pain During Urination] : no dysuria [Nocturia] : no nocturia [Joint Pain] : no joint pain [Joint Swelling] : no joint swelling [Joint Stiffness] : no joint stiffness [Muscle Cramps] : no muscle cramps [Myalgia] : no myalgia [Rash] : no rash [Itching] : no itching [Change In Color Of Skin] : change in skin color [Skin Lesions] : no skin lesions [Telangiectasias] : no telangiectasias [Dizziness] : no dizziness [Tremor] : no tremor was seen [Numbness (Hypoesthesia)] : no numbness [Convulsions] : no convulsions [Tingling (Paresthesia)] : no tingling [Weakness] : no weakness [Limb Weakness (Paresis)] : no limb weakness (Paresis) [Speech Disturbance] : no speech disturbance [Confusion] : no confusion was observed [Memory Lapses Or Loss] : no memory lapses or loss [Depression] : no depression [Anxiety] : no anxiety [Under Stress] : not under stress [Suicidal] : not suicidal [Easy Bleeding] : no tendency for easy bleeding [Swollen Glands] : no swollen glands [Easy Bruising] : no tendency for easy bruising [Negative] : Heme/Lymph

## 2021-08-16 NOTE — HISTORY OF PRESENT ILLNESS
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 He has no new complaints.\par During the  COVID pandemic, he has no event, no manifestations.\par

## 2022-02-14 ENCOUNTER — APPOINTMENT (OUTPATIENT)
Dept: CARDIOLOGY | Facility: CLINIC | Age: 77
End: 2022-02-14

## 2022-02-17 ENCOUNTER — NON-APPOINTMENT (OUTPATIENT)
Age: 77
End: 2022-02-17

## 2022-02-17 ENCOUNTER — APPOINTMENT (OUTPATIENT)
Dept: CARDIOLOGY | Facility: CLINIC | Age: 77
End: 2022-02-17
Payer: MEDICARE

## 2022-02-17 VITALS
RESPIRATION RATE: 18 BRPM | HEART RATE: 75 BPM | WEIGHT: 190 LBS | OXYGEN SATURATION: 96 % | TEMPERATURE: 97.8 F | BODY MASS INDEX: 29.76 KG/M2 | SYSTOLIC BLOOD PRESSURE: 120 MMHG | DIASTOLIC BLOOD PRESSURE: 67 MMHG

## 2022-02-17 PROCEDURE — 99214 OFFICE O/P EST MOD 30 MIN: CPT

## 2022-02-17 PROCEDURE — 93000 ELECTROCARDIOGRAM COMPLETE: CPT | Mod: 59

## 2022-02-17 RX ORDER — ZOLPIDEM TARTRATE 5 MG/1
5 TABLET ORAL
Qty: 30 | Refills: 0 | Status: ACTIVE | COMMUNITY
Start: 2022-02-08

## 2022-02-17 NOTE — DISCUSSION/SUMMARY
[Coronary Artery Disease] : coronary artery disease [Hyperlipidemia] : hyperlipidemia [Diet Modification] : diet modification [Hypertension] : hypertension [Stable] : stable [Responding to Treatment] : responding to treatment [None] : There are no changes in medication management [Exercise Regimen] : an exercise regimen [Dietary Modification] : dietary modification [Smoking Cessation] : smoking cessation [Acetaminophen Avoidance] : acetaminophen  avoidance [Low Sodium Diet] : low sodium diet [NSAIDs Avoidance] : non-steroidal anti-inflammatory drugs avoidance [Patient] : the patient [Minutes Spent: ___] : for [unfilled] ~Uminutes [With Me] : with me [___ Month(s)] : in [unfilled] month(s) [FreeTextEntry1] : \par

## 2022-02-17 NOTE — HISTORY OF PRESENT ILLNESS
[FreeTextEntry1] : Patient, a 76 year old male, is doing well with current medical regimens.\par He is  free of chest pain, shortness of breath, dizziness  or palpitations, but no intention to  exercise.\par He reminds me that he had couple times of fatal GI bleeding from ulcer by ASA.\par  He checked his  BP and heart rate ( pulse rate) were WNL. He has no new complaints.\par During the  COVID pandemic, he has no event, no manifestations.\par

## 2022-02-17 NOTE — REVIEW OF SYSTEMS
[Negative] : Heme/Lymph [Headache] : no headache [Fever] : no fever [Feeling Fatigued] : not feeling fatigued [Chills] : no chills [Blurry Vision] : no blurred vision [Seeing Double (Diplopia)] : no diplopia [Eye Pain] : no eye pain [Earache] : no earache [Hearing Loss] : no hearing loss [Discharge From Ears] : no discharge from the ears [Mouth Sores] : no mouth sores [Sore Throat] : no sore throat [Sinus Pressure] : no sinus pressure [Tinnitus] : no tinnitus [Vertigo] : no vertigo [SOB] : no shortness of breath [Dyspnea on exertion] : not dyspnea during exertion [Chest Discomfort] : no chest discomfort [Lower Ext Edema] : no extremity edema [Leg Claudication] : no intermittent leg claudication [Palpitations] : no palpitations [Orthopnea] : no orthopnea [PND] : no PND [Syncope] : no syncope [Cough] : no cough [Wheezing] : no wheezing [Coughing Up Blood] : no hemoptysis [Snoring] : no snoring [Abdominal Pain] : no abdominal pain [Nausea] : no nausea [Vomiting] : no vomiting [Heartburn] : no heartburn [Change in Appetite] : no change in appetite [Change In The Stool] : no change in stool [Dysphagia] : no dysphagia [Diarrhea] : diarrhea [Constipation] : no constipation [Blood in stool] : no blood in stoo [Urinary Frequency] : no change in urinary frequency [Hematuria] : no hematuria [Pain During Urination] : no dysuria [Nocturia] : no nocturia [Joint Pain] : no joint pain [Joint Swelling] : no joint swelling [Joint Stiffness] : no joint stiffness [Muscle Cramps] : no muscle cramps [Myalgia] : no myalgia [Rash] : no rash [Itching] : no itching [Change In Color Of Skin] : change in skin color [Skin Lesions] : no skin lesions [Telangiectasias] : no telangiectasias [Dizziness] : no dizziness [Tremor] : no tremor was seen [Numbness (Hypoesthesia)] : no numbness [Convulsions] : no convulsions [Tingling (Paresthesia)] : no tingling [Weakness] : no weakness [Limb Weakness (Paresis)] : no limb weakness (Paresis) [Speech Disturbance] : no speech disturbance [Confusion] : no confusion was observed [Memory Lapses Or Loss] : no memory lapses or loss [Depression] : no depression [Anxiety] : no anxiety [Under Stress] : not under stress [Suicidal] : not suicidal [Easy Bleeding] : no tendency for easy bleeding [Swollen Glands] : no swollen glands [Easy Bruising] : no tendency for easy bruising

## 2022-02-17 NOTE — PHYSICAL EXAM
[Normal Radial B/L] : normal radial B/L [Normal PT B/L] : normal PT B/L [Normal DP B/L] : normal DP B/L [General Appearance - Well Developed] : well developed [Normal Appearance] : normal appearance [Well Groomed] : well groomed [General Appearance - Well Nourished] : well nourished [No Deformities] : no deformities [General Appearance - In No Acute Distress] : no acute distress [Normal Conjunctiva] : the conjunctiva exhibited no abnormalities [Eyelids - No Xanthelasma] : the eyelids demonstrated no xanthelasmas [Normal Oral Mucosa] : normal oral mucosa [No Oral Pallor] : no oral pallor [No Oral Cyanosis] : no oral cyanosis [Normal Jugular Venous A Waves Present] : normal jugular venous A waves present [Normal Jugular Venous V Waves Present] : normal jugular venous V waves present [No Jugular Venous Jimenez A Waves] : no jugular venous jimenez A waves [Respiration, Rhythm And Depth] : normal respiratory rhythm and effort [Exaggerated Use Of Accessory Muscles For Inspiration] : no accessory muscle use [Auscultation Breath Sounds / Voice Sounds] : lungs were clear to auscultation bilaterally [Chest Palpation] : palpation of the chest revealed no abnormalities [Lungs Percussion] : the lungs were normal to percussion [Heart Rate And Rhythm] : heart rate and rhythm were normal [Heart Sounds] : normal S1 and S2 [Murmurs] : no murmurs present [Arterial Pulses Normal] : the arterial pulses were normal [Edema] : no peripheral edema present [Veins - Varicosity Changes] : no varicosital changes were noted in the lower extremities [Bowel Sounds] : normal bowel sounds [Abdomen Soft] : soft [Abdomen Tenderness] : non-tender [Abdomen Mass (___ Cm)] : no abdominal mass palpated [Abdomen Hernia] : no hernia was discovered [Abnormal Walk] : normal gait [Gait - Sufficient For Exercise Testing] : the gait was sufficient for exercise testing [Nail Clubbing] : no clubbing of the fingernails [Cyanosis, Localized] : no localized cyanosis [Petechial Hemorrhages (___cm)] : no petechial hemorrhages [Skin Turgor] : normal skin turgor [] : no rash [No Venous Stasis] : no venous stasis [No Skin Ulcers] : no skin ulcer [No Xanthoma] : no  xanthoma was observed [Oriented To Time, Place, And Person] : oriented to person, place, and time [Affect] : the affect was normal [Mood] : the mood was normal [No Anxiety] : not feeling anxious [FreeTextEntry1] :  the distal peripheral pulses of lower extremities is presented and 3+, equal.

## 2022-08-22 ENCOUNTER — APPOINTMENT (OUTPATIENT)
Dept: CARDIOLOGY | Facility: CLINIC | Age: 77
End: 2022-08-22

## 2022-09-29 ENCOUNTER — APPOINTMENT (OUTPATIENT)
Dept: CARDIOLOGY | Facility: CLINIC | Age: 77
End: 2022-09-29

## 2022-09-29 ENCOUNTER — NON-APPOINTMENT (OUTPATIENT)
Age: 77
End: 2022-09-29

## 2022-09-29 VITALS
HEART RATE: 77 BPM | TEMPERATURE: 98.4 F | WEIGHT: 196 LBS | OXYGEN SATURATION: 96 % | RESPIRATION RATE: 18 BRPM | BODY MASS INDEX: 30.7 KG/M2 | SYSTOLIC BLOOD PRESSURE: 145 MMHG | DIASTOLIC BLOOD PRESSURE: 77 MMHG

## 2022-09-29 VITALS — DIASTOLIC BLOOD PRESSURE: 70 MMHG | SYSTOLIC BLOOD PRESSURE: 129 MMHG

## 2022-09-29 DIAGNOSIS — I73.9 PERIPHERAL VASCULAR DISEASE, UNSPECIFIED: ICD-10-CM

## 2022-09-29 PROCEDURE — 99214 OFFICE O/P EST MOD 30 MIN: CPT | Mod: 25

## 2022-09-29 PROCEDURE — 93000 ELECTROCARDIOGRAM COMPLETE: CPT | Mod: 59

## 2022-09-29 RX ORDER — ENALAPRIL MALEATE 5 MG/1
5 TABLET ORAL DAILY
Qty: 90 | Refills: 1 | Status: ACTIVE | COMMUNITY
Start: 2019-03-20

## 2022-09-29 NOTE — HISTORY OF PRESENT ILLNESS
[FreeTextEntry1] : Patient, a 77 year old male, is doing well with current medical regimens.\par He is  free of chest pain, shortness of breath, dizziness  or palpitations, but no intention to  exercise.\par He reminds me that he had couple times of fatal GI bleeding from ulcer by ASA.\par  He checked his  BP and heart rate ( pulse rate) were WNL. He has no new complaints.\par During the  COVID pandemic, he has no event, no manifestations.\par He has been busy with the management of intoleravble vertigo and family stress with sick wife, father in low.\par

## 2022-09-29 NOTE — REVIEW OF SYSTEMS
fair plus [Negative] : Heme/Lymph [Fever] : no fever [Headache] : no headache [Chills] : no chills [Feeling Fatigued] : not feeling fatigued [Blurry Vision] : no blurred vision [Seeing Double (Diplopia)] : no diplopia [Eye Pain] : no eye pain [Earache] : no earache [Discharge From Ears] : no discharge from the ears [Hearing Loss] : no hearing loss [Mouth Sores] : no mouth sores [Sore Throat] : no sore throat [Sinus Pressure] : no sinus pressure [Tinnitus] : no tinnitus [Vertigo] : no vertigo [SOB] : no shortness of breath [Dyspnea on exertion] : not dyspnea during exertion [Chest Discomfort] : no chest discomfort [Lower Ext Edema] : no extremity edema [Leg Claudication] : no intermittent leg claudication [Palpitations] : no palpitations [Orthopnea] : no orthopnea [PND] : no PND [Syncope] : no syncope [Cough] : no cough [Wheezing] : no wheezing [Coughing Up Blood] : no hemoptysis [Snoring] : no snoring [Abdominal Pain] : no abdominal pain [Nausea] : no nausea [Vomiting] : no vomiting [Heartburn] : no heartburn [Change in Appetite] : no change in appetite [Change In The Stool] : no change in stool [Dysphagia] : no dysphagia [Diarrhea] : diarrhea [Constipation] : no constipation [Blood in stool] : no blood in stoo [Urinary Frequency] : no change in urinary frequency [Hematuria] : no hematuria [Pain During Urination] : no dysuria [Nocturia] : no nocturia [Joint Pain] : no joint pain [Joint Swelling] : no joint swelling [Joint Stiffness] : no joint stiffness [Muscle Cramps] : no muscle cramps [Myalgia] : no myalgia [Rash] : no rash [Itching] : no itching [Change In Color Of Skin] : change in skin color [Skin Lesions] : no skin lesions [Telangiectasias] : no telangiectasias [Dizziness] : no dizziness [Tremor] : no tremor was seen [Numbness (Hypoesthesia)] : no numbness [Convulsions] : no convulsions [Tingling (Paresthesia)] : no tingling [Weakness] : no weakness [Limb Weakness (Paresis)] : no limb weakness (Paresis) [Speech Disturbance] : no speech disturbance [Confusion] : no confusion was observed [Memory Lapses Or Loss] : no memory lapses or loss [Depression] : no depression [Anxiety] : no anxiety [Under Stress] : not under stress [Suicidal] : not suicidal [Easy Bleeding] : no tendency for easy bleeding [Swollen Glands] : no swollen glands [Easy Bruising] : no tendency for easy bruising

## 2023-03-30 ENCOUNTER — APPOINTMENT (OUTPATIENT)
Dept: CARDIOLOGY | Facility: CLINIC | Age: 78
End: 2023-03-30
Payer: MEDICARE

## 2023-03-30 ENCOUNTER — NON-APPOINTMENT (OUTPATIENT)
Age: 78
End: 2023-03-30

## 2023-03-30 VITALS
HEART RATE: 58 BPM | WEIGHT: 194 LBS | OXYGEN SATURATION: 98 % | RESPIRATION RATE: 18 BRPM | BODY MASS INDEX: 30.38 KG/M2 | SYSTOLIC BLOOD PRESSURE: 108 MMHG | DIASTOLIC BLOOD PRESSURE: 67 MMHG | TEMPERATURE: 96.8 F

## 2023-03-30 PROCEDURE — 99214 OFFICE O/P EST MOD 30 MIN: CPT | Mod: 25

## 2023-03-30 PROCEDURE — 93000 ELECTROCARDIOGRAM COMPLETE: CPT | Mod: 59

## 2023-03-30 NOTE — HISTORY OF PRESENT ILLNESS
[FreeTextEntry1] : Patient, a 77 year old male, is doing well with current medical regimens.\par He is  free of chest pain, shortness of breath, dizziness  or palpitations, but no intention to  exercise.\par He reminds me that he had couple times of fatal GI bleeding from ulcer by ASA.\par  He checked his  BP and heart rate ( pulse rate) were WNL. He has no new complaints.\par During the  COVID pandemic, he has no event, no manifestations.\par He has been busy with the management of intolerable vertigo and family stress with sick wife, & sick father- in- law.\par

## 2023-10-02 ENCOUNTER — APPOINTMENT (OUTPATIENT)
Dept: CARDIOLOGY | Facility: CLINIC | Age: 78
End: 2023-10-02
Payer: MEDICARE

## 2023-10-02 VITALS
SYSTOLIC BLOOD PRESSURE: 113 MMHG | HEART RATE: 65 BPM | HEIGHT: 67 IN | OXYGEN SATURATION: 96 % | RESPIRATION RATE: 18 BRPM | WEIGHT: 190 LBS | TEMPERATURE: 97.7 F | DIASTOLIC BLOOD PRESSURE: 69 MMHG | BODY MASS INDEX: 29.82 KG/M2

## 2023-10-02 PROCEDURE — 99214 OFFICE O/P EST MOD 30 MIN: CPT

## 2023-10-02 PROCEDURE — 93000 ELECTROCARDIOGRAM COMPLETE: CPT | Mod: 59

## 2023-10-02 RX ORDER — METHOCARBAMOL 500 MG/1
500 TABLET, FILM COATED ORAL
Qty: 16 | Refills: 0 | Status: ACTIVE | COMMUNITY
Start: 2023-05-11

## 2023-10-02 RX ORDER — GABAPENTIN 100 MG/1
100 CAPSULE ORAL
Qty: 30 | Refills: 0 | Status: DISCONTINUED | COMMUNITY
Start: 2023-05-11 | End: 2023-10-02

## 2023-10-02 RX ORDER — ESCITALOPRAM OXALATE 5 MG/1
5 TABLET ORAL
Qty: 30 | Refills: 0 | Status: ACTIVE | COMMUNITY
Start: 2023-05-09

## 2023-10-02 RX ORDER — GABAPENTIN 300 MG/1
300 CAPSULE ORAL
Qty: 60 | Refills: 0 | Status: ACTIVE | COMMUNITY
Start: 2023-06-06

## 2023-10-02 RX ORDER — ATORVASTATIN CALCIUM 10 MG/1
10 TABLET, FILM COATED ORAL
Qty: 30 | Refills: 2 | Status: ACTIVE | COMMUNITY
Start: 2019-07-17

## 2023-10-02 RX ORDER — DEXAMETHASONE 2 MG/1
2 TABLET ORAL
Qty: 15 | Refills: 0 | Status: ACTIVE | COMMUNITY
Start: 2023-05-16

## 2023-10-02 RX ORDER — METOPROLOL SUCCINATE 50 MG/1
50 TABLET, EXTENDED RELEASE ORAL
Qty: 90 | Refills: 0 | Status: ACTIVE | COMMUNITY
Start: 2023-09-08

## 2023-10-02 RX ORDER — TRAZODONE HYDROCHLORIDE 50 MG/1
50 TABLET ORAL
Qty: 30 | Refills: 0 | Status: ACTIVE | COMMUNITY
Start: 2023-04-06

## 2023-10-02 RX ORDER — MELOXICAM 15 MG/1
15 TABLET ORAL
Qty: 15 | Refills: 0 | Status: ACTIVE | COMMUNITY
Start: 2023-05-09

## 2024-04-01 ENCOUNTER — RESULT CHARGE (OUTPATIENT)
Age: 79
End: 2024-04-01

## 2024-04-02 ENCOUNTER — NON-APPOINTMENT (OUTPATIENT)
Age: 79
End: 2024-04-02

## 2024-04-02 ENCOUNTER — APPOINTMENT (OUTPATIENT)
Dept: CARDIOLOGY | Facility: CLINIC | Age: 79
End: 2024-04-02
Payer: MEDICARE

## 2024-04-02 VITALS
OXYGEN SATURATION: 96 % | WEIGHT: 185 LBS | BODY MASS INDEX: 28.98 KG/M2 | HEART RATE: 63 BPM | TEMPERATURE: 97.8 F | SYSTOLIC BLOOD PRESSURE: 114 MMHG | RESPIRATION RATE: 18 BRPM | DIASTOLIC BLOOD PRESSURE: 66 MMHG

## 2024-04-02 DIAGNOSIS — I25.10 ATHEROSCLEROTIC HEART DISEASE OF NATIVE CORONARY ARTERY W/OUT ANGINA PECTORIS: ICD-10-CM

## 2024-04-02 DIAGNOSIS — E78.5 HYPERLIPIDEMIA, UNSPECIFIED: ICD-10-CM

## 2024-04-02 DIAGNOSIS — I10 ESSENTIAL (PRIMARY) HYPERTENSION: ICD-10-CM

## 2024-04-02 PROCEDURE — G2211 COMPLEX E/M VISIT ADD ON: CPT

## 2024-04-02 PROCEDURE — 93000 ELECTROCARDIOGRAM COMPLETE: CPT

## 2024-04-02 PROCEDURE — 99214 OFFICE O/P EST MOD 30 MIN: CPT

## 2024-04-02 NOTE — HISTORY OF PRESENT ILLNESS
[FreeTextEntry1] : 78 year old M with CAD s/p PCI to D1 (last in 2011), HTN, HLD who presents for f/u.  Meds: Plavix 75, enalapril 5, metoprolol succinate 50mg daily, atorvastatin 10mg daily (prescribed by PCP)  TTE 2/11/20 - normal LVEF 60%, trace MR, no AI, trace TR, asymmetric septal hypertrophy  Pt has been doing well without symptoms. He has no chest pain or SOB. No issues with bleeding. He walks 1.5 hours every day without limitation. His BP and HR are well controlled.  Last seen by Dr. Alarcon 10/2/23 - 78 year old male, is doing well with current medical regimens. He is free of chest pain, shortness of breath, dizziness or palpitations, but no intention to exercise. He is here for f/u. He reminds me that he had couple times of fatal GI bleeding from ulcer by  He checked his BP and heart rate ( pulse rate) were WNL. He has no new complaints. During the COVID pandemic, he has no event, no manifestations. He has been busy with the management of intolerable vertigo and family stress with sick wife, & sick father- in- law.

## 2024-04-02 NOTE — ASSESSMENT
[FreeTextEntry1] : 78 year old M with CAD s/p PCI to D1 (last in 2011), HTN, HLD who presents for f/u.  Pt has been doing well without symptoms. He has no chest pain or SOB. No issues with bleeding. He walks 1.5 hours every day without limitation. His BP and HR are well controlled.  1) CAD s/p PCI to D1 2) HTN 3) HLD - TTE 2/11/20 - normal LVEF 60%, trace MR, no AI, trace TR, asymmetric septal hypertrophy - EKG today showed sinus rhythm with q waves in V1-V2 (similar to prior) - Continue Plavix 75 and atorvastatin 10mg daily  - On Toprol 50mg XL daily and Enalapril 5mg daily - Monitor for symptoms  4) Follow-up 6 months

## 2024-10-02 ENCOUNTER — APPOINTMENT (OUTPATIENT)
Dept: CARDIOLOGY | Facility: CLINIC | Age: 79
End: 2024-10-02
Payer: MEDICARE

## 2024-10-02 ENCOUNTER — NON-APPOINTMENT (OUTPATIENT)
Age: 79
End: 2024-10-02

## 2024-10-02 VITALS
WEIGHT: 198 LBS | SYSTOLIC BLOOD PRESSURE: 129 MMHG | BODY MASS INDEX: 31.01 KG/M2 | HEART RATE: 83 BPM | OXYGEN SATURATION: 96 % | DIASTOLIC BLOOD PRESSURE: 74 MMHG | RESPIRATION RATE: 16 BRPM

## 2024-10-02 DIAGNOSIS — I25.10 ATHEROSCLEROTIC HEART DISEASE OF NATIVE CORONARY ARTERY W/OUT ANGINA PECTORIS: ICD-10-CM

## 2024-10-02 DIAGNOSIS — E78.5 HYPERLIPIDEMIA, UNSPECIFIED: ICD-10-CM

## 2024-10-02 DIAGNOSIS — I10 ESSENTIAL (PRIMARY) HYPERTENSION: ICD-10-CM

## 2024-10-02 PROCEDURE — 93000 ELECTROCARDIOGRAM COMPLETE: CPT

## 2024-10-02 PROCEDURE — G2211 COMPLEX E/M VISIT ADD ON: CPT

## 2024-10-02 PROCEDURE — 99214 OFFICE O/P EST MOD 30 MIN: CPT

## 2024-10-02 NOTE — HISTORY OF PRESENT ILLNESS
[FreeTextEntry1] : Pt has been stable since last visit. He visits his wife at nursing home weekly every Wednesday and tries to walk at least 1-1.15 hours 2-3 times per week. No CP, SOB, palpitation, dizziness, LOC, orthopnea, PND, or LE edema. He is taking Plavix without bleeding issues. He states his PCP recently increased his atorvastatin to 20mg daily.   4/2/24 - Pt has been doing well without symptoms. He has no chest pain or SOB. No issues with bleeding. He walks 1.5 hours every day without limitation. His BP and HR are well controlled.  Last seen by Dr. Alarcon 10/2/23 - 78 year old male, is doing well with current medical regimens. He is free of chest pain, shortness of breath, dizziness or palpitations, but no intention to exercise. He is here for f/u. He reminds me that he had couple times of fatal GI bleeding from ulcer by  He checked his BP and heart rate ( pulse rate) were WNL. He has no new complaints. During the COVID pandemic, he has no event, no manifestations. He has been busy with the management of intolerable vertigo and family stress with sick wife, & sick father- in- law.

## 2024-10-02 NOTE — ASSESSMENT
[FreeTextEntry1] : 79 year old M with CAD s/p PCI to D1 (last in 2011), HTN, HLD who presents for f/u.  1) CAD s/p PCI to D1 2) HTN 3) HLD - TTE 2/11/20 - normal LVEF 60%, trace MR, no AI, trace TR, asymmetric septal hypertrophy - EKG today showed sinus rhythm with q waves in V1-V2 (similar to prior) - Continue Plavix 75 and atorvastatin 20mg daily - On Toprol 50mg XL daily and Enalapril 5mg daily - Monitor for symptoms. Overall, pt states he prefers not to do many tests. Discussed that if he develops CP/SOB, he would benefit from repeat testing at that time.  4) Follow-up, 6 months or sooner if symptomatic

## 2024-10-02 NOTE — REASON FOR VISIT
[Coronary Artery Disease] : coronary artery disease [FreeTextEntry1] : 79 year old M with CAD s/p PCI to D1 (last in 2011), HTN, HLD who presents for f/u.  Meds: Plavix 75, enalapril 5, metoprolol succinate 50mg daily, atorvastatin 20mg daily (prescribed by PCP)  TTE 2/11/20 - normal LVEF 60%, trace MR, no AI, trace TR, asymmetric septal hypertrophy

## 2024-11-15 NOTE — DISCUSSION/SUMMARY
Subjective   Leonardo Ayala is a 70 y.o. male.   Chief Complaint   Patient presents with    Altered Mental State     Refer to pt msg that was sent today.        History of Present Illness   History of Present Illness  The patient presents to the office today with a chief complaint of altered mental status, low-grade fever, and generally feeling unwell.    He received his influenza vaccine on Monday and has been experiencing symptoms since Tuesday. These symptoms include body aches, fatigue, fevers, excessive sweating to the point of soaking his clothes, chills, and difficulty finding enough blankets to keep warm. He also reports feeling hot at the moment. His daughter, Gulshan, mentioned that he was disoriented, did not know where he was, and was talking to people who are no longer alive.  He also reports he does not think his phone works because somebody on the television zapped it.  His fever was recorded as 100.5°F. He was unable to walk or stand up but reports feeling better now. He has been taking his regular medications.  There are also reports of unwitnessed falls.    He has a history of nephrolithiasis and follows up with urologist Dr. Campbell. A urine test conducted in the office today shows evidence of moderate blood, but negative for nitrites, leukocytes, and protein. He reports no significant pain in his flank or lower back (on exam did have bilateral CVA tenderness). His urine is yellow, and he reports no issues with urination, no burning sensation during urination, and no bowel issues. He does report feeling full after urinating, but only a small amount of urine is produced, and he wakes up twice at night to urinate. He has been experiencing diarrhea since receiving the influenza vaccine.    He has been feeling dizzy and has had trouble with balance, using a walker for mobility, atypical for him. He reports no chest pain or shortness of breath, no history of diverticulitis, no headaches, no history of  [EKG obtained to assist in diagnosis and management of assessed problem(s)] : EKG obtained to assist in diagnosis and management of assessed problem(s) stroke or TIA, and no blurry vision, spots, or floaters. He had eye surgery in January or February of 2024. He has been coughing up mucus and has been taking Mucinex. He reports no numbness or tingling in his body. He has fallen 5 times, does not recall if he hit his head. He lives alone and has not consumed alcohol in the past six months.  He denies taking any new medications.  It is observed that oxycodone is not a new medication for him and has not had issues with it in the past.    He has been helping his son with various tasks and has been keeping busy. He saw Dr. Pollock on Monday, and his A1c level was 6.5 percent.        The following portions of the patient's history were reviewed and updated as appropriate: allergies, current medications, past family history, past medical history, past social history, past surgical history and problem list.    Review of Systems    Objective   Past Medical History:   Diagnosis Date    Abnormal lung sounds 03/06/2020    Acute myocardial infarction of anterior wall 06/22/2020    Arthritis     Asthma     Back pain     Bronchitis     Coronary artery disease     COVID-19     covid 12/19/2023    Diabetes mellitus     Diverticulitis 2021    Elevated cholesterol     GERD (gastroesophageal reflux disease)     Hematuria 07/30/2023    Hyperlipidemia     Hypertension     Kidney stones     Obstructive sleep apnea treated with continuous positive airway pressure (CPAP)     Pneumonia 07/25/2023    Pneumonia of left lower lobe due to infectious organism 03/06/2020      Past Surgical History:   Procedure Laterality Date    ANTERIOR LUMBAR EXPOSURE N/A 01/22/2020    Procedure: ANTERIOR LUMBAR EXPOSURE;  Surgeon: Krzysztof Gould DO;  Location:  [facility];  Service: Vascular    APPENDECTOMY      BUNIONECTOMY Left 12/28/2023    Procedure: TAYLOR HALLUX VALGUS REPAIR, LEFT FOOT;  Surgeon: Paddy Blevins DPM;  Location:  [facility];  Service: Podiatry;  Laterality: Left;    CARDIAC SURGERY   2006    double bypass    CARPAL TUNNEL RELEASE      CYSTOSCOPY WITH CLOT EVACUATION N/A 08/01/2023    Procedure: CYSTOSCOPY WITH CLOT EVACUATION, FULGURATION OF PROSTATE BLEEDING;  Surgeon: Isidro Campbell MD;  Location:  [facility];  Service: Urology;  Laterality: N/A;    JOINT REPLACEMENT Right     knee    KIDNEY STONE SURGERY      LUMBAR FUSION N/A 01/22/2020    Procedure: ANTERIOR DECOMPRESSION, ANTERIOR LUMBAR INTERBODY FUSION WITH INSTRUMENTATION L5-S1;  Surgeon: AKHIL Howard MD;  Location:  [facility];  Service: Orthopedic Spine    LUMBAR FUSION Left 01/29/2020    Procedure: LEFT LATERAL LUMBAR INTERBODY FUSION WITH INSTRUMENTATION L4-5;  Surgeon: AKHIL Howard MD;  Location:  [facility];  Service: Orthopedic Spine    LUMBAR LAMINECTOMY WITH FUSION N/A 01/31/2020    Procedure: POSTERIOR SPINAL FUSION WITH INSTRUMENTATION L4-S1;  Surgeon: AKHIL Howard MD;  Location:  [facility];  Service: Orthopedic Spine;  Laterality: N/A;    LUMBAR LAMINECTOMY WITH FUSION Right 03/11/2022    Procedure: RIGHT L3-4 HEMILAMINECTOMY, PARTIAL FACETECTOMY DECOMPRESSION, EXCISION OF FACET CYST;  Surgeon: AKHIL Howard MD;  Location:  [facility];  Service: Orthopedic Spine;  Laterality: Right;    TOOTH EXTRACTION  08/2024        Current Outpatient Medications:     albuterol sulfate  (90 Base) MCG/ACT inhaler, Inhale 2 puffs Every 4 (Four) Hours As Needed for Wheezing or Shortness of Air., Disp: 18 g, Rfl: 11    allopurinol (Zyloprim) 300 MG tablet, one daily, Disp: 90 tablet, Rfl: 3    atorvastatin (LIPITOR) 40 MG tablet, Take 1 tablet by mouth Every Night., Disp: 90 tablet, Rfl: 3    azelastine (ASTELIN) 0.1 % nasal spray, 2 sprays into the nostril(s) as directed by provider 2 (Two) Times a Day. Use in each nostril as directed, Disp: 30 mL, Rfl: 12    busPIRone (BUSPAR) 5 MG tablet, Take 2 tablets by mouth 3 (Three) Times a Day., Disp: 180 tablet, Rfl: 3    finasteride (PROSCAR) 5 MG tablet, Take 1 tablet  by mouth Daily., Disp: 90 tablet, Rfl: 3    Fluticasone Furoate-Vilanterol (Breo Ellipta) 100-25 MCG/ACT aerosol powder , Inhale 1 puff Daily., Disp: 60 each, Rfl: 11    glipizide (GLUCOTROL XL) 5 MG ER tablet, TAKE 1 TABLET DAILY, Disp: 90 tablet, Rfl: 3    ipratropium-albuterol (DUO-NEB) 0.5-2.5 mg/3 ml nebulizer, Take 3 mL by nebulization 4 (Four) Times a Day As Needed for Wheezing., Disp: 360 mL, Rfl: 3    losartan (COZAAR) 25 MG tablet, Take 1 tablet by mouth Every Night., Disp: 90 tablet, Rfl: 3    metFORMIN (GLUCOPHAGE) 500 MG tablet, TAKE 2 TABLETS TWICE A DAY, Disp: 360 tablet, Rfl: 3    metoprolol tartrate (LOPRESSOR) 25 MG tablet, TAKE 1 TABLET TWICE A DAY, Disp: 180 tablet, Rfl: 3    naloxone (NARCAN) 4 MG/0.1ML nasal spray, Call 911. Don't prime. [place] in 1 nostril for overdose. Repeat in 2-3 minutes in other nostril if no or minimal breathing/responsiveness., Disp: 2 each, Rfl: 0    omeprazole (priLOSEC) 40 MG capsule, Take 1 capsule by mouth Daily As Needed (indigestion)., Disp: 90 capsule, Rfl: 3    ondansetron (ZOFRAN) 4 MG tablet, Take 1 tablet by mouth Every 8 (Eight) Hours As Needed for Nausea or Vomiting., Disp: 15 tablet, Rfl: 0    oxyCODONE-acetaminophen (PERCOCET)  MG per tablet, TAKE ONE TABLET BY MOUTH NIGHTLY. MAY TAKE AN EXTRA 1/2-1 TABLET AS NEEDED ON MORE ACTIVE DAYS AS DIRECTED, Disp: 35 tablet, Rfl: 0    PARoxetine (PAXIL) 20 MG tablet, TAKE ONE TABLET BY MOUTH EVERY MORNING, Disp: 90 tablet, Rfl: 3    sucralfate (Carafate) 1 g tablet, Take 1 tablet by mouth 4 (Four) Times a Day As Needed (indigestion)., Disp: 90 tablet, Rfl: 3    zolpidem (AMBIEN) 10 MG tablet, Take 1 tablet by mouth At Night As Needed for Sleep., Disp: 30 tablet, Rfl: 5    Blood Glucose Monitoring Suppl (Blood Glucose Monitor System) w/Device kit, Use 1 Application 3 times a day. (Patient not taking: Reported on 11/15/2024), Disp: 1 each, Rfl: 0    cefdinir (OMNICEF) 300 MG capsule, Take 1 capsule by mouth 2  "(Two) Times a Day for 9 days., Disp: 18 capsule, Rfl: 0    glucose blood test strip, 1 each by Other route 2 (Two) Times a Day. Use as instructed (Patient not taking: Reported on 11/15/2024), Disp: 200 each, Rfl: 3    Lancets misc, Use 1 each 2 (Two) Times a Day. (Patient not taking: Reported on 11/15/2024), Disp: 200 each, Rfl: 3  No current facility-administered medications for this visit.      /56 (BP Location: Left arm, Patient Position: Sitting, Cuff Size: Adult)   Pulse 66   Temp 98.2 °F (36.8 °C) (Oral)   Ht 180.3 cm (71\")   Wt 112 kg (246 lb)   SpO2 95%   BMI 34.31 kg/m²      Body mass index is 34.31 kg/m².          Physical Exam  Eyes:      General: No visual field deficit.  Neurological:      Mental Status: He is alert and oriented to person, place, and time.      GCS: GCS eye subscore is 4. GCS verbal subscore is 5. GCS motor subscore is 6.      Cranial Nerves: No facial asymmetry.      Sensory: Sensation is intact.      Motor: No tremor, seizure activity or pronator drift.      Coordination: Romberg sign positive. Coordination abnormal. Finger-Nose-Finger Test and Heel to Shin Test normal.      Gait: Gait is intact. Tandem walk abnormal.               Assessment & Plan   Diagnoses and all orders for this visit:    1. Altered mental status, unspecified altered mental status type (Primary)  -     POC Urinalysis Dipstick, Multipro  -     CT Head Without Contrast; Future  -     CBC w AUTO Differential; Future  -     Comprehensive metabolic panel; Future    2. Acute nonintractable headache, unspecified headache type  -     CT Head Without Contrast; Future    3. Hallucinations  -     CT Head Without Contrast; Future  -     CT Abdomen Pelvis Without Contrast; Future  -     Urine Culture - Urine, Urine, Clean Catch    4. Abnormal gait  -     CT Head Without Contrast; Future    5. Urinary tract infection with hematuria, site unspecified  -     cefTRIAXone (ROCEPHIN) injection 1 g  -     cefdinir " (OMNICEF) 300 MG capsule; Take 1 capsule by mouth 2 (Two) Times a Day for 9 days.  Dispense: 18 capsule; Refill: 0    6. Hematuria, unspecified type  -     CT Abdomen Pelvis Without Contrast; Future  -     CBC w AUTO Differential; Future  -     Comprehensive metabolic panel; Future  -     Urine Culture - Urine, Urine, Clean Catch    7. History of nephrolithiasis  -     CT Abdomen Pelvis Without Contrast; Future    8. Flank pain  -     CT Abdomen Pelvis Without Contrast; Future                 Assessment & Plan  1. Altered mental status.  Given his altered mental status, systemic infection or neurological issue is suspected. His balance and gait abnormalities, along with a headache, raise concerns. His blood pressure is slightly lower than usual, and he is at a high risk for falls due to his gait instability. A CT scan of the abdomen, pelvis, and head will be ordered to rule out potential causes such as a bleed or mini stroke. Blood work will also be conducted. An antibiotic injection of Rocephin will be administered today, and a prescription for antibiotics will be sent to the pharmacy. He is advised to increase his water intake and ensure someone is present with him at home for safety. Should his condition worsen or if today's treatment proves ineffective, he is advised to seek immediate medical attention at the emergency room.      2. Fever.  He has been experiencing fever since Tuesday, with a temperature of 100.5°F (highest documented but reports severe chills and getting soaked in sweat nightly since Tuesday). The fever could be related to a systemic infection or another underlying cause. A CT scan and blood work will help determine the cause. He is advised to monitor his temperature and seek medical attention if it worsens.    3. Nephrolithiasis.  He has a history of nephrolithiasis and reported passing three kidney stones last week. There is moderate blood in his urine, but no leukocytes, nitrites, or  protein. A CT scan will be performed to check for any obstructing stones. He is advised to drink plenty of water and to ambulate safely  to help pass any remaining stones.    4. Diarrhea.  He has experienced diarrhea since receiving the flu shot. This could be a reaction to the vaccine or another underlying issue. He is advised to monitor his symptoms and report any worsening or persistence.      Phone call was made and I discussed imaging and lab results with both Mr. Ayala and his daughter Gulshan.    I did explain both to Mr. Ayala as well as his daughter Gulshan who brought him to the office today -CT of the head ruled out acute abnormality such as hemorrhaging, would likely note a mass, does not rule out TIA or ischemic stroke, although I have low suspicion this is something that needs to be acknowledged if there is any continued cognitive changes or more strokelike symptoms.  I stressed the importance of him not being left alone tonight since he does live alone, I recommend he either stay at his daughter's house or his daughter stay with him as I do feel that he is high risk for injury given the significance of his gait abnormality and with recurrence of hallucinations.  When I talked on the phone his daughter reported that she is already noticing an improvement in him.    The CT of abdomen pelvis was remarkable for evidence of multiple right renal stones as well as 1 left renal stone, none appear to be obstructing, there is no hydronephrosis, no urinary bladder abnormality was noted.  There is no diverticulitis or colitis.  CBC is completely normal, CMP is significant for a very slight decline in renal function, emphasized the importance once again of increasing water intake.  Urine culture will not be available for review until least Monday, I am going to go ahead and cover him with Omnicef additionally he is aware not to start this until tomorrow.    Once again advised to seek emergent medical attention in  the emergency room if there is decline over the weekend. We will touch base Monday via phone call at this time and proceed from there.       Patient or patient representative verbalized consent for the use of Ambient Listening during the visit with  GONZALO Pak for chart documentation. 11/15/2024  16:54 CST    Please note that portions of this note were completed with a voice recognition program.     Electronically signed by GONZALO Pak, 11/15/24, 17:05 CST.

## 2025-04-30 ENCOUNTER — APPOINTMENT (OUTPATIENT)
Dept: CARDIOLOGY | Facility: CLINIC | Age: 80
End: 2025-04-30
Payer: MEDICARE

## 2025-04-30 ENCOUNTER — NON-APPOINTMENT (OUTPATIENT)
Age: 80
End: 2025-04-30

## 2025-04-30 VITALS
WEIGHT: 194 LBS | BODY MASS INDEX: 30.38 KG/M2 | HEART RATE: 86 BPM | SYSTOLIC BLOOD PRESSURE: 121 MMHG | DIASTOLIC BLOOD PRESSURE: 70 MMHG | OXYGEN SATURATION: 95 % | RESPIRATION RATE: 18 BRPM

## 2025-04-30 DIAGNOSIS — R06.02 SHORTNESS OF BREATH: ICD-10-CM

## 2025-04-30 DIAGNOSIS — I10 ESSENTIAL (PRIMARY) HYPERTENSION: ICD-10-CM

## 2025-04-30 DIAGNOSIS — E78.5 HYPERLIPIDEMIA, UNSPECIFIED: ICD-10-CM

## 2025-04-30 DIAGNOSIS — I25.10 ATHEROSCLEROTIC HEART DISEASE OF NATIVE CORONARY ARTERY W/OUT ANGINA PECTORIS: ICD-10-CM

## 2025-04-30 PROCEDURE — 99214 OFFICE O/P EST MOD 30 MIN: CPT | Mod: 25

## 2025-04-30 PROCEDURE — 93000 ELECTROCARDIOGRAM COMPLETE: CPT

## 2025-05-26 ENCOUNTER — INPATIENT (INPATIENT)
Facility: HOSPITAL | Age: 80
LOS: 14 days | Discharge: NURSING, REHAB OR LONG-TERM CARE | DRG: 66 | End: 2025-06-10
Attending: NEUROLOGICAL SURGERY | Admitting: STUDENT IN AN ORGANIZED HEALTH CARE EDUCATION/TRAINING PROGRAM
Payer: SELF-PAY

## 2025-05-26 VITALS
TEMPERATURE: 99 F | HEART RATE: 92 BPM | DIASTOLIC BLOOD PRESSURE: 83 MMHG | OXYGEN SATURATION: 97 % | RESPIRATION RATE: 19 BRPM | SYSTOLIC BLOOD PRESSURE: 159 MMHG

## 2025-05-26 DIAGNOSIS — I60.9 NONTRAUMATIC SUBARACHNOID HEMORRHAGE, UNSPECIFIED: ICD-10-CM

## 2025-05-26 LAB
ALBUMIN SERPL ELPH-MCNC: 4.2 G/DL — SIGNIFICANT CHANGE UP (ref 3.3–5)
ALP SERPL-CCNC: 60 U/L — SIGNIFICANT CHANGE UP (ref 40–120)
ALT FLD-CCNC: 17 U/L — SIGNIFICANT CHANGE UP (ref 10–45)
ANION GAP SERPL CALC-SCNC: 16 MMOL/L — SIGNIFICANT CHANGE UP (ref 5–17)
APPEARANCE UR: CLEAR — SIGNIFICANT CHANGE UP
APTT BLD: 26.5 SEC — SIGNIFICANT CHANGE UP (ref 26.1–36.8)
AST SERPL-CCNC: 24 U/L — SIGNIFICANT CHANGE UP (ref 10–40)
BASOPHILS # BLD AUTO: 0.07 K/UL — SIGNIFICANT CHANGE UP (ref 0–0.2)
BASOPHILS NFR BLD AUTO: 0.7 % — SIGNIFICANT CHANGE UP (ref 0–2)
BILIRUB SERPL-MCNC: 0.5 MG/DL — SIGNIFICANT CHANGE UP (ref 0.2–1.2)
BILIRUB UR-MCNC: NEGATIVE — SIGNIFICANT CHANGE UP
BUN SERPL-MCNC: 11 MG/DL — SIGNIFICANT CHANGE UP (ref 7–23)
CALCIUM SERPL-MCNC: 9.3 MG/DL — SIGNIFICANT CHANGE UP (ref 8.4–10.5)
CHLORIDE SERPL-SCNC: 100 MMOL/L — SIGNIFICANT CHANGE UP (ref 96–108)
CO2 SERPL-SCNC: 19 MMOL/L — LOW (ref 22–31)
COLOR SPEC: YELLOW — SIGNIFICANT CHANGE UP
CREAT SERPL-MCNC: 1.01 MG/DL — SIGNIFICANT CHANGE UP (ref 0.5–1.3)
DIFF PNL FLD: ABNORMAL
EGFR: 76 ML/MIN/1.73M2 — SIGNIFICANT CHANGE UP
EGFR: 76 ML/MIN/1.73M2 — SIGNIFICANT CHANGE UP
EOSINOPHIL # BLD AUTO: 0.34 K/UL — SIGNIFICANT CHANGE UP (ref 0–0.5)
EOSINOPHIL NFR BLD AUTO: 3.6 % — SIGNIFICANT CHANGE UP (ref 0–6)
ETHANOL SERPL-MCNC: <10 MG/DL — SIGNIFICANT CHANGE UP (ref 0–10)
GAS PNL BLDA: SIGNIFICANT CHANGE UP
GLUCOSE SERPL-MCNC: 106 MG/DL — HIGH (ref 70–99)
GLUCOSE UR QL: NEGATIVE MG/DL — SIGNIFICANT CHANGE UP
HCT VFR BLD CALC: 41.3 % — SIGNIFICANT CHANGE UP (ref 39–50)
HGB BLD-MCNC: 14 G/DL — SIGNIFICANT CHANGE UP (ref 13–17)
IMM GRANULOCYTES NFR BLD AUTO: 0.4 % — SIGNIFICANT CHANGE UP (ref 0–0.9)
INR BLD: 1.01 RATIO — SIGNIFICANT CHANGE UP (ref 0.85–1.16)
KETONES UR QL: NEGATIVE MG/DL — SIGNIFICANT CHANGE UP
LACTATE SERPL-SCNC: 1.8 MMOL/L — SIGNIFICANT CHANGE UP (ref 0.5–2)
LEUKOCYTE ESTERASE UR-ACNC: ABNORMAL
LIDOCAIN IGE QN: 70 U/L — HIGH (ref 7–60)
LYMPHOCYTES # BLD AUTO: 3.46 K/UL — HIGH (ref 1–3.3)
LYMPHOCYTES # BLD AUTO: 36.2 % — SIGNIFICANT CHANGE UP (ref 13–44)
MCHC RBC-ENTMCNC: 31.4 PG — SIGNIFICANT CHANGE UP (ref 27–34)
MCHC RBC-ENTMCNC: 33.9 G/DL — SIGNIFICANT CHANGE UP (ref 32–36)
MCV RBC AUTO: 92.6 FL — SIGNIFICANT CHANGE UP (ref 80–100)
MONOCYTES # BLD AUTO: 0.64 K/UL — SIGNIFICANT CHANGE UP (ref 0–0.9)
MONOCYTES NFR BLD AUTO: 6.7 % — SIGNIFICANT CHANGE UP (ref 2–14)
NEUTROPHILS # BLD AUTO: 5.02 K/UL — SIGNIFICANT CHANGE UP (ref 1.8–7.4)
NEUTROPHILS NFR BLD AUTO: 52.4 % — SIGNIFICANT CHANGE UP (ref 43–77)
NITRITE UR-MCNC: NEGATIVE — SIGNIFICANT CHANGE UP
NRBC BLD AUTO-RTO: 0 /100 WBCS — SIGNIFICANT CHANGE UP (ref 0–0)
PA ADP PRP-ACNC: 257 PRU — SIGNIFICANT CHANGE UP (ref 182–335)
PH UR: 5.5 — SIGNIFICANT CHANGE UP (ref 5–8)
PLATELET # BLD AUTO: 217 K/UL — SIGNIFICANT CHANGE UP (ref 150–400)
PLATELET RESPONSE ASPIRIN RESULT: 649 ARU — SIGNIFICANT CHANGE UP
POTASSIUM SERPL-MCNC: 3.7 MMOL/L — SIGNIFICANT CHANGE UP (ref 3.5–5.3)
POTASSIUM SERPL-SCNC: 3.7 MMOL/L — SIGNIFICANT CHANGE UP (ref 3.5–5.3)
PROT SERPL-MCNC: 7.1 G/DL — SIGNIFICANT CHANGE UP (ref 6–8.3)
PROT UR-MCNC: SIGNIFICANT CHANGE UP MG/DL
PROTHROM AB SERPL-ACNC: 11.5 SEC — SIGNIFICANT CHANGE UP (ref 9.9–13.4)
RBC # BLD: 4.46 M/UL — SIGNIFICANT CHANGE UP (ref 4.2–5.8)
RBC # FLD: 12 % — SIGNIFICANT CHANGE UP (ref 10.3–14.5)
SODIUM SERPL-SCNC: 135 MMOL/L — SIGNIFICANT CHANGE UP (ref 135–145)
SP GR SPEC: >1.03 — HIGH (ref 1–1.03)
UROBILINOGEN FLD QL: 0.2 MG/DL — SIGNIFICANT CHANGE UP (ref 0.2–1)
WBC # BLD: 9.57 K/UL — SIGNIFICANT CHANGE UP (ref 3.8–10.5)
WBC # FLD AUTO: 9.57 K/UL — SIGNIFICANT CHANGE UP (ref 3.8–10.5)

## 2025-05-26 RX ORDER — ETOMIDATE 2 MG/ML
20 AMPUL (ML) INTRAVENOUS ONCE
Refills: 0 | Status: COMPLETED | OUTPATIENT
Start: 2025-05-26 | End: 2025-05-26

## 2025-05-26 RX ORDER — LEVETIRACETAM 10 MG/ML
1000 INJECTION, SOLUTION INTRAVENOUS ONCE
Refills: 0 | Status: DISCONTINUED | OUTPATIENT
Start: 2025-05-26 | End: 2025-05-26

## 2025-05-26 RX ORDER — FENTANYL CITRATE-0.9 % NACL/PF 100MCG/2ML
100 SYRINGE (ML) INTRAVENOUS ONCE
Refills: 0 | Status: DISCONTINUED | OUTPATIENT
Start: 2025-05-26 | End: 2025-05-26

## 2025-05-26 RX ORDER — FENTANYL CITRATE-0.9 % NACL/PF 100MCG/2ML
50 SYRINGE (ML) INTRAVENOUS ONCE
Refills: 0 | Status: DISCONTINUED | OUTPATIENT
Start: 2025-05-26 | End: 2025-05-26

## 2025-05-26 RX ORDER — ACETAMINOPHEN 500 MG/5ML
1000 LIQUID (ML) ORAL ONCE
Refills: 0 | Status: COMPLETED | OUTPATIENT
Start: 2025-05-26 | End: 2025-05-26

## 2025-05-26 RX ORDER — CEFAZOLIN SODIUM IN 0.9 % NACL 3 G/100 ML
2000 INTRAVENOUS SOLUTION, PIGGYBACK (ML) INTRAVENOUS ONCE
Refills: 0 | Status: COMPLETED | OUTPATIENT
Start: 2025-05-26 | End: 2025-05-26

## 2025-05-26 RX ORDER — MIDAZOLAM IN 0.9 % SOD.CHLORID 1 MG/ML
2 PLASTIC BAG, INJECTION (ML) INTRAVENOUS ONCE
Refills: 0 | Status: DISCONTINUED | OUTPATIENT
Start: 2025-05-26 | End: 2025-05-26

## 2025-05-26 RX ORDER — DESMOPRESSIN ACETATE 4 UG/ML
30 INJECTION INTRAVENOUS ONCE
Refills: 0 | Status: DISCONTINUED | OUTPATIENT
Start: 2025-05-26 | End: 2025-05-26

## 2025-05-26 RX ORDER — SUCCINYLCHOLINE CHLORIDE 20 MG/ML
100 VIAL (ML) INJECTION ONCE
Refills: 0 | Status: COMPLETED | OUTPATIENT
Start: 2025-05-26 | End: 2025-05-26

## 2025-05-26 RX ORDER — ONDANSETRON HCL/PF 4 MG/2 ML
8 VIAL (ML) INJECTION ONCE
Refills: 0 | Status: COMPLETED | OUTPATIENT
Start: 2025-05-26 | End: 2025-05-26

## 2025-05-26 RX ORDER — FENTANYL CITRATE-0.9 % NACL/PF 100MCG/2ML
0.5 SYRINGE (ML) INTRAVENOUS
Qty: 2500 | Refills: 0 | Status: DISCONTINUED | OUTPATIENT
Start: 2025-05-26 | End: 2025-05-26

## 2025-05-26 RX ORDER — BUDESONIDE 0.25 MG/2ML
0.25 SUSPENSION RESPIRATORY (INHALATION) EVERY 12 HOURS
Refills: 0 | Status: DISCONTINUED | OUTPATIENT
Start: 2025-05-26 | End: 2025-06-01

## 2025-05-26 RX ORDER — LEVETIRACETAM 10 MG/ML
500 INJECTION, SOLUTION INTRAVENOUS
Refills: 0 | Status: DISCONTINUED | OUTPATIENT
Start: 2025-05-26 | End: 2025-05-28

## 2025-05-26 RX ORDER — FENTANYL CITRATE-0.9 % NACL/PF 100MCG/2ML
0.5 SYRINGE (ML) INTRAVENOUS
Qty: 5000 | Refills: 0 | Status: DISCONTINUED | OUTPATIENT
Start: 2025-05-26 | End: 2025-05-27

## 2025-05-26 RX ORDER — DEXMEDETOMIDINE HYDROCHLORIDE IN SODIUM CHLORIDE 4 UG/ML
0.7 INJECTION INTRAVENOUS
Qty: 200 | Refills: 0 | Status: DISCONTINUED | OUTPATIENT
Start: 2025-05-26 | End: 2025-05-29

## 2025-05-26 RX ORDER — DESMOPRESSIN ACETATE 4 UG/ML
30 INJECTION INTRAVENOUS ONCE
Refills: 0 | Status: COMPLETED | OUTPATIENT
Start: 2025-05-26 | End: 2025-05-26

## 2025-05-26 RX ORDER — MIDAZOLAM IN 0.9 % SOD.CHLORID 1 MG/ML
0.02 PLASTIC BAG, INJECTION (ML) INTRAVENOUS
Qty: 100 | Refills: 0 | Status: DISCONTINUED | OUTPATIENT
Start: 2025-05-26 | End: 2025-05-27

## 2025-05-26 RX ORDER — LIDOCAINE HCL/PF 10 MG/ML
10 VIAL (ML) INJECTION ONCE
Refills: 0 | Status: COMPLETED | OUTPATIENT
Start: 2025-05-26 | End: 2025-05-26

## 2025-05-26 RX ORDER — MIDAZOLAM IN 0.9 % SOD.CHLORID 1 MG/ML
4 PLASTIC BAG, INJECTION (ML) INTRAVENOUS ONCE
Refills: 0 | Status: DISCONTINUED | OUTPATIENT
Start: 2025-05-26 | End: 2025-05-26

## 2025-05-26 RX ORDER — IPRATROPIUM BROMIDE AND ALBUTEROL SULFATE .5; 2.5 MG/3ML; MG/3ML
3 SOLUTION RESPIRATORY (INHALATION) EVERY 6 HOURS
Refills: 0 | Status: DISCONTINUED | OUTPATIENT
Start: 2025-05-26 | End: 2025-05-31

## 2025-05-26 RX ORDER — LEVETIRACETAM 10 MG/ML
1000 INJECTION, SOLUTION INTRAVENOUS ONCE
Refills: 0 | Status: COMPLETED | OUTPATIENT
Start: 2025-05-26 | End: 2025-05-26

## 2025-05-26 RX ORDER — ATORVASTATIN CALCIUM 80 MG/1
80 TABLET, FILM COATED ORAL AT BEDTIME
Refills: 0 | Status: DISCONTINUED | OUTPATIENT
Start: 2025-05-26 | End: 2025-06-05

## 2025-05-26 RX ADMIN — Medication 40 MILLIEQUIVALENT(S): at 22:32

## 2025-05-26 RX ADMIN — Medication 100 MICROGRAM(S): at 14:07

## 2025-05-26 RX ADMIN — Medication 50 MICROGRAM(S): at 13:24

## 2025-05-26 RX ADMIN — Medication 50 MICROGRAM(S): at 13:09

## 2025-05-26 RX ADMIN — Medication 15 MILLILITER(S): at 18:14

## 2025-05-26 RX ADMIN — Medication 20 MILLIGRAM(S): at 12:57

## 2025-05-26 RX ADMIN — Medication 1000 MILLILITER(S): at 18:42

## 2025-05-26 RX ADMIN — Medication 100 MILLIGRAM(S): at 12:57

## 2025-05-26 RX ADMIN — IPRATROPIUM BROMIDE AND ALBUTEROL SULFATE 3 MILLILITER(S): .5; 2.5 SOLUTION RESPIRATORY (INHALATION) at 23:13

## 2025-05-26 RX ADMIN — ATORVASTATIN CALCIUM 80 MILLIGRAM(S): 80 TABLET, FILM COATED ORAL at 22:32

## 2025-05-26 RX ADMIN — Medication 2 MILLIGRAM(S): at 13:09

## 2025-05-26 RX ADMIN — Medication 1000 MILLILITER(S): at 21:20

## 2025-05-26 RX ADMIN — Medication 2.13 MICROGRAM(S)/KG/HR: at 14:30

## 2025-05-26 RX ADMIN — Medication 50 MICROGRAM(S): at 13:15

## 2025-05-26 RX ADMIN — Medication 1.8 MG/KG/HR: at 14:29

## 2025-05-26 RX ADMIN — Medication 50 MICROGRAM(S): at 13:30

## 2025-05-26 RX ADMIN — Medication 20 MILLIGRAM(S): at 13:05

## 2025-05-26 RX ADMIN — DESMOPRESSIN ACETATE 230 MICROGRAM(S): 4 INJECTION INTRAVENOUS at 14:43

## 2025-05-26 RX ADMIN — Medication 2 MILLIGRAM(S): at 13:28

## 2025-05-26 RX ADMIN — Medication 400 MILLIGRAM(S): at 23:00

## 2025-05-26 RX ADMIN — DEXMEDETOMIDINE HYDROCHLORIDE IN SODIUM CHLORIDE 16.3 MICROGRAM(S)/KG/HR: 4 INJECTION INTRAVENOUS at 20:26

## 2025-05-26 RX ADMIN — Medication 100 MILLIGRAM(S): at 13:05

## 2025-05-26 RX ADMIN — Medication 10 MILLILITER(S): at 20:30

## 2025-05-26 RX ADMIN — Medication 2.13 MICROGRAM(S)/KG/HR: at 16:55

## 2025-05-26 RX ADMIN — Medication 4 MILLIGRAM(S): at 13:46

## 2025-05-26 RX ADMIN — Medication 1.8 MG/KG/HR: at 16:55

## 2025-05-26 RX ADMIN — Medication 8 MILLIGRAM(S): at 12:55

## 2025-05-26 RX ADMIN — LEVETIRACETAM 400 MILLIGRAM(S): 10 INJECTION, SOLUTION INTRAVENOUS at 13:57

## 2025-05-26 RX ADMIN — Medication 50 MICROGRAM(S): at 13:45

## 2025-05-26 RX ADMIN — Medication 50 MICROGRAM(S): at 13:29

## 2025-05-26 RX ADMIN — DEXMEDETOMIDINE HYDROCHLORIDE IN SODIUM CHLORIDE 16.3 MICROGRAM(S)/KG/HR: 4 INJECTION INTRAVENOUS at 16:55

## 2025-05-26 RX ADMIN — Medication 100 MICROGRAM(S): at 13:47

## 2025-05-26 RX ADMIN — LEVETIRACETAM 400 MILLIGRAM(S): 10 INJECTION, SOLUTION INTRAVENOUS at 18:13

## 2025-05-26 RX ADMIN — Medication 100 MILLIGRAM(S): at 20:26

## 2025-05-26 RX ADMIN — Medication 2 MILLIGRAM(S): at 13:15
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
## 2025-05-26 NOTE — ED ADULT NURSE NOTE - NSFALLHARMRISKINTERV_ED_ALL_ED
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
## 2025-05-26 NOTE — CHART NOTE - NSCHARTNOTEFT_GEN_A_CORE
EEG Preliminary Report, first 3 hours (disconnected by team)    Generalized slowing and attenuation.  No epileptiform activity.    Final report to follow.    Ana Cardoza MD

## 2025-05-26 NOTE — ED PROVIDER NOTE - PHYSICAL EXAMINATION
gen - unresponsive  neuro - not following commands, localizes pain  eyes - 3mm reactive bl, doesn't open eyes at all  skin - abrasions left calf, lacerations occiput  msk - exts intact  cv - rrr  resp - ctab, slight tachypnea  temp - afebrile  heme - minimal bleeding scalp
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
## 2025-05-26 NOTE — CHART NOTE - NSCHARTNOTEFT_GEN_A_CORE
CAPRINI SCORE [CLOT] Score on Admission for     AGE RELATED RISK FACTORS                                                       MOBILITY RELATED FACTORS  [ ] Age 41-60 years                                            (1 Point)                  [ ] Bed rest                                                        (1 Point)  [ ] Age: 61-74 years                                           (2 Points)                 [ ] Plaster cast                                                   (2 Points)  [ x] Age= 75 years                                              (3 Points)                 [ ] Bed bound for more than 72 hours                 (2 Points)    DISEASE RELATED RISK FACTORS                                               GENDER SPECIFIC FACTORS  [ ] Edema in the lower extremities                       (1 Point)                  [ ] Pregnancy                                                     (1 Point)  [ ] Varicose veins                                               (1 Point)                  [ ] Post-partum < 6 weeks                                   (1 Point)             [ ] BMI > 25 Kg/m2                                            (1 Point)                  [ ] Hormonal therapy  or oral contraception          (1 Point)                 [ ] Sepsis (in the previous month)                        (1 Point)                  [ ] History of pregnancy complications                 (1 point)  [ ] Pneumonia or serious lung disease                                               [ ] Unexplained or recurrent                     (1 Point)           (in the previous month)                               (1 Point)  [x ] Abnormal pulmonary function test                     (1 Point)                 SURGERY RELATED RISK FACTORS (include planned surgeries)  [ ] Acute myocardial infarction                              (1 Point)                 [ ]  Section                                             (1 Point)  [x ] Congestive heart failure (in the previous month)  (1 Point)         [ ] Minor surgery                                                  (1 Point)   [ ] Inflammatory bowel disease                             (1 Point)                 [ ] Arthroscopic surgery                                        (2 Points)  [ ] Central venous access                                      (2 Points)                [ ] General surgery lasting more than 45 minutes   (2 Points)       [ ] Stroke (in the previous month)                          (5 Points)               [ ] Elective arthroplasty                                         (5 Points)            [ ] current or past malignancy                              (2 Points)                                                                                                       HEMATOLOGY RELATED FACTORS                                                 TRAUMA RELATED RISK FACTORS  [ ] Prior episodes of VTE                                     (3 Points)                [ ] Fracture of the hip, pelvis, or leg                       (5 Points)  [ ] Positive family history for VTE                         (3 Points)                 [ ] Acute spinal cord injury (in the previous month)  (5 Points)  [ ] Prothrombin 87191 A                                     (3 Points)                 [ ] Paralysis  (less than 1 month)                             (5 Points)  [ ] Factor V Leiden                                             (3 Points)                  [ ] Multiple Trauma within 1 month                        (5 Points)  [ ] Lupus anticoagulants                                     (3 Points)                                                           [ ] Anticardiolipin antibodies                               (3 Points)                                                       [ ] High homocysteine in the blood                      (3 Points)                                             [ ] Other congenital or acquired thrombophilia      (3 Points)                                                [ ] Heparin induced thrombocytopenia                  (3 Points)                                          Total Score [  5        ]    Risk:  Very low 0   Low 1 to 2   Moderate 3 to 4   High =5       VTE Prophylasix Recommednations:  [x ] mechanical pneumatic compression devices                                      [ ] contraindicated: _____________________  [ ] chemo prophylasix                                                                                   [x ] contraindicated _____________________    **** HIGH LIKELIHOOD DVT PRESENT ON ADMISSION  [ ] (please order LE dopplers within 24 hours of admission)
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
## 2025-05-26 NOTE — PROGRESS NOTE ADULT - ASSESSMENT
ASSESSMENT       PLAN     N  # TBI   rCTH at 4-5pm  #Precedex for neuro exam  #Pain: Tylenol and oxycodone prn  #Activity: [] OOB as tolerated [x] Bedrest [] PT [] OT [] PMNR    CV   #SBP >110  #TTE     P   #Respiratory failure  intubated   18/450/6/40%  ABG   secretions  chest PT   no risk factors for laryngeal edema, trial SBT when off sedation       R   #Strict I+Os   #IVL     GI   #Diet: npo   #Senna miralax  Last BM PTA     ID  afebrile    E  Goal euglycemia (-180)  #A1c     H  #DVT ppx:   SCDs  Chemoppx held   LED pending       #CODE STATUS: FULL CODE     #DISPOSITION: ICU    #CRITICAL  ASSESSMENT       PLAN     N  # TBI w/ non comminutive skull fractures   rCTH at 4-5pm  #Precedex for neuro exam - reassess now off versed   #Pain: Tylenol and fentanyl prn  #Activity: [] OOB as tolerated [x] Bedrest [] PT [] OT [] PMNR    CV   #SBP >110  #TTE     P   #Respiratory failure  intubated   18/450/6/40%  ABG   secretions  chest PT   no risk factors for laryngeal edema, trial SBT when off sedation       R   #Strict I+Os   #IVL     GI   #Diet: npo   #Senna miralax  Last BM PTA   #splenic laceration     ID  afebrile    E  Goal euglycemia (-180)  #A1c     H  #DVT ppx:   SCDs  Chemoppx held   LED pending       #CODE STATUS: FULL CODE     #DISPOSITION: ICU    #CRITICAL
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
## 2025-05-26 NOTE — PROGRESS NOTE ADULT - SUBJECTIVE AND OBJECTIVE BOX
NSCU ATTENDING -- ADDITIONAL PROGRESS NOTE    Nighttime rounds were performed -- please refer to earlier Progress Note for HPI details.    T(C): 37.4 (05-26-25 @ 19:00), Max: 37.4 (05-26-25 @ 12:53)  HR: 95 (05-26-25 @ 23:15) (80 - 105)  BP: 94/52 (05-26-25 @ 21:25) (86/54 - 180/86)  RR: 13 (05-26-25 @ 21:25) (13 - 19)  SpO2: 100% (05-26-25 @ 23:15) (97% - 100%)  Wt(kg): --    Relevant labwork and imaging reviewed.    repeat head CT with increased IPH, SDH, SAH.  s/p bolt placement, ICPs wnl.  febrile, cultured.  on cydney GTT for SBP >110.
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
## 2025-05-26 NOTE — ED ADULT TRIAGE NOTE - BANDS:
"Pt became tearful when asked about SI. She denied suicidal thoughts but said, \"I'd rather not answer that.\" Triage nurse informed pt that she could talk to any one of the nurses today if she changed her mind. Nurse again offered for pt to have evaluation before finishing triage process. Pt assured nurse that she would ask for help when needed.  "
Fall Risk;

## 2025-05-26 NOTE — PATIENT PROFILE ADULT - FALL HARM RISK - HARM RISK INTERVENTIONS

## 2025-05-27 ENCOUNTER — RESULT REVIEW (OUTPATIENT)
Age: 80
End: 2025-05-27

## 2025-05-27 LAB
ANION GAP SERPL CALC-SCNC: 13 MMOL/L — SIGNIFICANT CHANGE UP (ref 5–17)
ANION GAP SERPL CALC-SCNC: 9 MMOL/L — SIGNIFICANT CHANGE UP (ref 5–17)
BACTERIA # UR AUTO: NEGATIVE /HPF — SIGNIFICANT CHANGE UP
BUN SERPL-MCNC: 11 MG/DL — SIGNIFICANT CHANGE UP (ref 7–23)
BUN SERPL-MCNC: 12 MG/DL — SIGNIFICANT CHANGE UP (ref 7–23)
CALCIUM SERPL-MCNC: 7.7 MG/DL — LOW (ref 8.4–10.5)
CALCIUM SERPL-MCNC: 8 MG/DL — LOW (ref 8.4–10.5)
CAST: 2 /LPF — SIGNIFICANT CHANGE UP (ref 0–4)
CHLORIDE SERPL-SCNC: 105 MMOL/L — SIGNIFICANT CHANGE UP (ref 96–108)
CHLORIDE SERPL-SCNC: 106 MMOL/L — SIGNIFICANT CHANGE UP (ref 96–108)
CO2 SERPL-SCNC: 20 MMOL/L — LOW (ref 22–31)
CO2 SERPL-SCNC: 21 MMOL/L — LOW (ref 22–31)
CREAT SERPL-MCNC: 0.73 MG/DL — SIGNIFICANT CHANGE UP (ref 0.5–1.3)
CREAT SERPL-MCNC: 0.83 MG/DL — SIGNIFICANT CHANGE UP (ref 0.5–1.3)
EGFR: 89 ML/MIN/1.73M2 — SIGNIFICANT CHANGE UP
EGFR: 89 ML/MIN/1.73M2 — SIGNIFICANT CHANGE UP
EGFR: 93 ML/MIN/1.73M2 — SIGNIFICANT CHANGE UP
EGFR: 93 ML/MIN/1.73M2 — SIGNIFICANT CHANGE UP
GLUCOSE SERPL-MCNC: 134 MG/DL — HIGH (ref 70–99)
GLUCOSE SERPL-MCNC: 166 MG/DL — HIGH (ref 70–99)
GRAM STN FLD: SIGNIFICANT CHANGE UP
HCT VFR BLD CALC: 34 % — LOW (ref 39–50)
HGB BLD-MCNC: 11.4 G/DL — LOW (ref 13–17)
MAGNESIUM SERPL-MCNC: 2 MG/DL — SIGNIFICANT CHANGE UP (ref 1.6–2.6)
MCHC RBC-ENTMCNC: 32.2 PG — SIGNIFICANT CHANGE UP (ref 27–34)
MCHC RBC-ENTMCNC: 33.5 G/DL — SIGNIFICANT CHANGE UP (ref 32–36)
MCV RBC AUTO: 96 FL — SIGNIFICANT CHANGE UP (ref 80–100)
NRBC BLD AUTO-RTO: 0 /100 WBCS — SIGNIFICANT CHANGE UP (ref 0–0)
PHOSPHATE SERPL-MCNC: 2.4 MG/DL — LOW (ref 2.5–4.5)
PLATELET # BLD AUTO: 148 K/UL — LOW (ref 150–400)
POTASSIUM SERPL-MCNC: 4.1 MMOL/L — SIGNIFICANT CHANGE UP (ref 3.5–5.3)
POTASSIUM SERPL-MCNC: 4.8 MMOL/L — SIGNIFICANT CHANGE UP (ref 3.5–5.3)
POTASSIUM SERPL-SCNC: 4.1 MMOL/L — SIGNIFICANT CHANGE UP (ref 3.5–5.3)
POTASSIUM SERPL-SCNC: 4.8 MMOL/L — SIGNIFICANT CHANGE UP (ref 3.5–5.3)
RBC # BLD: 3.54 M/UL — LOW (ref 4.2–5.8)
RBC # FLD: 12.1 % — SIGNIFICANT CHANGE UP (ref 10.3–14.5)
RBC CASTS # UR COMP ASSIST: 2 /HPF — SIGNIFICANT CHANGE UP (ref 0–4)
REVIEW: SIGNIFICANT CHANGE UP
SODIUM SERPL-SCNC: 135 MMOL/L — SIGNIFICANT CHANGE UP (ref 135–145)
SODIUM SERPL-SCNC: 139 MMOL/L — SIGNIFICANT CHANGE UP (ref 135–145)
SPECIMEN SOURCE: SIGNIFICANT CHANGE UP
SQUAMOUS # UR AUTO: 5 /HPF — SIGNIFICANT CHANGE UP (ref 0–5)
WBC # BLD: 9.41 K/UL — SIGNIFICANT CHANGE UP (ref 3.8–10.5)
WBC # FLD AUTO: 9.41 K/UL — SIGNIFICANT CHANGE UP (ref 3.8–10.5)
WBC UR QL: 17 /HPF — HIGH (ref 0–5)

## 2025-05-27 RX ORDER — POLYETHYLENE GLYCOL 3350 17 G/17G
17 POWDER, FOR SOLUTION ORAL
Refills: 0 | Status: DISCONTINUED | OUTPATIENT
Start: 2025-05-27 | End: 2025-06-05

## 2025-05-27 RX ORDER — CLONAZEPAM 0.5 MG/1
1 TABLET ORAL
Refills: 0 | DISCHARGE

## 2025-05-27 RX ORDER — ENALAPRIL MALEATE 20 MG
1 TABLET ORAL
Refills: 0 | DISCHARGE

## 2025-05-27 RX ORDER — ALFUZOSIN HYDROCHLORIDE 10 MG/1
1 TABLET, EXTENDED RELEASE ORAL
Refills: 0 | DISCHARGE

## 2025-05-27 RX ORDER — SENNA 187 MG
2 TABLET ORAL AT BEDTIME
Refills: 0 | Status: DISCONTINUED | OUTPATIENT
Start: 2025-05-27 | End: 2025-06-05

## 2025-05-27 RX ORDER — SODIUM CHLORIDE 3 G/100ML
1000 INJECTION, SOLUTION INTRAVENOUS
Refills: 0 | Status: DISCONTINUED | OUTPATIENT
Start: 2025-05-27 | End: 2025-05-29

## 2025-05-27 RX ORDER — METOPROLOL SUCCINATE 50 MG/1
1 TABLET, EXTENDED RELEASE ORAL
Refills: 0 | DISCHARGE

## 2025-05-27 RX ORDER — PHENYLEPHRINE HCL IN 0.9% NACL 0.5 MG/5ML
0.1 SYRINGE (ML) INTRAVENOUS
Qty: 40 | Refills: 0 | Status: DISCONTINUED | OUTPATIENT
Start: 2025-05-27 | End: 2025-05-27

## 2025-05-27 RX ORDER — UMECLIDINIUM BROMIDE AND VILANTEROL TRIFENATATE 62.5; 25 UG/1; UG/1
1 POWDER RESPIRATORY (INHALATION)
Refills: 0 | DISCHARGE

## 2025-05-27 RX ADMIN — IPRATROPIUM BROMIDE AND ALBUTEROL SULFATE 3 MILLILITER(S): .5; 2.5 SOLUTION RESPIRATORY (INHALATION) at 23:29

## 2025-05-27 RX ADMIN — POLYETHYLENE GLYCOL 3350 17 GRAM(S): 17 POWDER, FOR SOLUTION ORAL at 17:43

## 2025-05-27 RX ADMIN — IPRATROPIUM BROMIDE AND ALBUTEROL SULFATE 3 MILLILITER(S): .5; 2.5 SOLUTION RESPIRATORY (INHALATION) at 05:17

## 2025-05-27 RX ADMIN — SODIUM CHLORIDE 100 MILLILITER(S): 3 INJECTION, SOLUTION INTRAVENOUS at 08:00

## 2025-05-27 RX ADMIN — BUDESONIDE 0.25 MILLIGRAM(S): 0.25 SUSPENSION RESPIRATORY (INHALATION) at 05:17

## 2025-05-27 RX ADMIN — DEXMEDETOMIDINE HYDROCHLORIDE IN SODIUM CHLORIDE 16.3 MICROGRAM(S)/KG/HR: 4 INJECTION INTRAVENOUS at 07:30

## 2025-05-27 RX ADMIN — LEVETIRACETAM 400 MILLIGRAM(S): 10 INJECTION, SOLUTION INTRAVENOUS at 05:12

## 2025-05-27 RX ADMIN — Medication 15 MILLILITER(S): at 17:39

## 2025-05-27 RX ADMIN — Medication 40 MILLIGRAM(S): at 12:00

## 2025-05-27 RX ADMIN — BUDESONIDE 0.25 MILLIGRAM(S): 0.25 SUSPENSION RESPIRATORY (INHALATION) at 17:07

## 2025-05-27 RX ADMIN — Medication 15 MILLILITER(S): at 05:12

## 2025-05-27 RX ADMIN — Medication 2 TABLET(S): at 21:29

## 2025-05-27 RX ADMIN — IPRATROPIUM BROMIDE AND ALBUTEROL SULFATE 3 MILLILITER(S): .5; 2.5 SOLUTION RESPIRATORY (INHALATION) at 11:01

## 2025-05-27 RX ADMIN — Medication 1 APPLICATION(S): at 21:29

## 2025-05-27 RX ADMIN — Medication 3.48 MICROGRAM(S)/KG/MIN: at 04:31

## 2025-05-27 RX ADMIN — DEXMEDETOMIDINE HYDROCHLORIDE IN SODIUM CHLORIDE 16.3 MICROGRAM(S)/KG/HR: 4 INJECTION INTRAVENOUS at 17:40

## 2025-05-27 RX ADMIN — IPRATROPIUM BROMIDE AND ALBUTEROL SULFATE 3 MILLILITER(S): .5; 2.5 SOLUTION RESPIRATORY (INHALATION) at 17:07

## 2025-05-27 RX ADMIN — Medication 3.48 MICROGRAM(S)/KG/MIN: at 20:10

## 2025-05-27 RX ADMIN — ATORVASTATIN CALCIUM 80 MILLIGRAM(S): 80 TABLET, FILM COATED ORAL at 21:29

## 2025-05-27 RX ADMIN — LEVETIRACETAM 400 MILLIGRAM(S): 10 INJECTION, SOLUTION INTRAVENOUS at 17:39
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
## 2025-05-27 NOTE — AIRWAY REMOVAL NOTE  ADULT & PEDS - ARTIFICAL AIRWAY REMOVAL COMMENTS
Written order for extubation verified. The patient was identified by full name and birth date compared to the identification band. Present during the procedure was  VALENTIN Lawson RN

## 2025-05-27 NOTE — PROGRESS NOTE ADULT - SUBJECTIVE AND OBJECTIVE BOX
NSCU ATTENDING -- ADDITIONAL PROGRESS NOTE    Nighttime rounds were performed -- please refer to earlier Progress Note for HPI details.    T(C): 37.6 (05-27-25 @ 15:00), Max: 37.6 (05-27-25 @ 15:00)  HR: 69 (05-27-25 @ 21:00) (64 - 102)  BP: 182/77 (05-27-25 @ 21:00) (110/58 - 182/77)  RR: 18 (05-27-25 @ 21:00) (11 - 26)  SpO2: 98% (05-27-25 @ 21:00) (97% - 100%)  Wt(kg): --    Relevant labwork and imaging reviewed.    bolt in, ICPs within normal limits.  patient more interactive today.  put on 4L NC target pbtO2 > 20.

## 2025-05-27 NOTE — EEG REPORT - NS EEG TEXT BOX
REPORT OF CONTINUOUS VIDEO EEG    [facility]: [address] [facility] [address], [place] [address], Phone: [phone]  [facility]: [address] [place] [address], Phone: [phone]  [facility]: [address] [place] [address], Phone: [phone]    Patient Name: Eduin Tomlin   Age: 79 year, : 1945  Wang: [place] [address]  Referring Physician: -    Study Start: 2025	17:05:51 PM      Study End:  2025	08:00. EEG is off from 20:00-2:00   Study Duration: 7 hr  24 min    -------------------------------------------------------------------------------------------------------  EEG Recording Technique:  The patient underwent continuous Video-EEG monitoring, using Telemetry System hardware on the XLTek Digital System. EEG and video data were stored on a computer hard drive with important events saved in digital archive files. The material was reviewed by a physician (electroencephalographer / epileptologist) on a daily basis. Kalen and seizure detection algorithms were utilized and reviewed. An EEG Technician attended to the patient, and was available throughout daytime work hours.  The epilepsy center neurologist was available in person or on call 24-hours per day.    EEG Placement and Labeling of Electrodes:  The EEG was performed utilizing 20 channel referential EEG connections (coronal over temporal over parasagittal montage) using all standard 10-20 electrode placements with EKG, with additional electrodes placed in the inferior temporal region using the modified 10-10 montage electrode placements for elective admissions, or if deemed necessary. Recording was at a sampling rate of 256 samples per second per channel. Time synchronized digital video recording was done simultaneously with EEG recording. A low light infrared camera was used for low light recording.     -------------------------------------------------------------------------------------------------------  History: -  79 year old Male with AMS is here for evaluation    Medication  KEPPRA IVPB      -------------------------------------------------------------------------------------------------------  Interpretation:    [[[Abbreviation Key:  PDR=alpha rhythm/posterior dominant rhythm. A-P=anterior posterior.  Amplitude: ‘very low’:<20; ‘low’:20-49; ‘medium’:; ‘high’:>150uV.  Persistence for periodic/rhythmic patterns (% of epoch) ‘rare’:<1%; ‘occasional’:1-10%; ‘frequent’:10-50%; ‘abundant’:50-90%; ‘continuous’:>90%.  Persistence for sporadic discharges: ‘rare’:<1/hr; ‘occasional’:1/min-1/hr; ‘frequent’:>1/min; ‘abundant’:>1/10 sec.  RPP=rhythmic and periodic patterns; GRDA=generalized rhythmic delta activity; FIRDA=frontal intermittent GRDA; LRDA=lateralized rhythmic delta activity; TIRDA=temporal intermittent rhythmic delta activity;  LPD=PLED=lateralized periodic discharges; GPD=generalized periodic discharges; BIPDs =bilateral independent periodic discharges; Mf=multifocal; SIRPDs=stimulus induced rhythmic, periodic, or ictal appearing discharges; BIRDs=brief potentially ictal rhythmic discharges >4 Hz, lasting .5-10s; PFA (paroxysmal bursts >13 Hz or =8 Hz <10s).  Modifiers: +F=with fast component; +S=with spike component; +R=with rhythmic component.  S-B=burst suppression pattern.  Max=maximal. N1-drowsy; N2-stage II sleep; N3-slow wave sleep. SSS/BETS=small sharp spikes/benign epileptiform transients of sleep. HV=hyperventilation; PS=photic stimulation]]]    FINDINGS:      Background:  SYMMETRY: symmetric  CONTINUOUS: continuous  PDR: unclear except for fragments of poorly organized 8 Hz  REACTIVITY: present  VOLTAGE: normal, [defined typically between 20-150uV]  AP GRADIENT: absent  BREACH: absent  OTHER: none    GENERALIZED SLOWING: present. Background is diffusely slow consisting of polymorphic delta theta activity    FOCAL SLOWING: none was present.    State Changes:   Drowsiness was characterized by fragmentation, attenuation, and slowing of the background activity.    N2 sleep transients with symmetric spindles and poorly formed K-complexes.      Sporadic Epileptiform Discharges:   None    Rhythmic and Periodic Patterns (RPPs):  None     Electrographic and Electroclinical seizures:  None    Other Clinical Events:  None    Activation Procedures:   Hyperventilation was not performed.    Photic stimulation was not performed.      Artifacts:  Intermittent myogenic and movement artifacts were noted.    ECG:  The heart rate on single channel ECG was predominantly between 70-80 BPM.  -------------------------------------------------------------------------------------------------------  EEG Classification:    Abnormal EEG in sedated and intubated state  1. Mild to moderate diffuse background slowing	    -------------------------------------------------------------------------------------------------------  EEG Impression / Clinical Correlate:    Abnormal prolonged EEG study due to Mild to moderate diffuse cerebral dysfunction that is not specific in etiology    No epileptic discharges recorded.  No seizures recorded.      -------------------------------------------------------------------------------------------------------  ARIANNA Roe  Attending Physician, [facility]    ------------------------------------  EEG Reading Room: [phone]  On Call Service After Hours: [phone]

## 2025-05-27 NOTE — PROGRESS NOTE ADULT - ASSESSMENT
ASSESSMENT       PLAN     N  # TBI w/ non comminutive skull fractures   rCTH at 4-5pm  #Precedex for neuro exam - reassess now off versed   #Pain: Tylenol and fentanyl prn  #Activity: [] OOB as tolerated [x] Bedrest [] PT [] OT [] PMNR    CV   #SBP >110  #TTE     P   #Respiratory failure  intubated   18/450/6/40%  ABG   secretions  chest PT   no risk factors for laryngeal edema, trial SBT when off sedation       R   #Strict I+Os   #IVL     GI   #Diet: npo   #Senna miralax  Last BM PTA   #splenic laceration     ID  afebrile    E  Goal euglycemia (-180)  #A1c     H  #DVT ppx:   SCDs  Chemoppx held   LED pending       #CODE STATUS: FULL CODE     #DISPOSITION: ICU    #CRITICAL

## 2025-05-27 NOTE — PROGRESS NOTE ADULT - SUBJECTIVE AND OBJECTIVE BOX
NSICU PROGRESS NOTE     Please see resident H&P for HPI.     Admission score: GCS 8     12h events   admitted to NSICU post TBI     -----------------------------------------------------------------------------------------------------  Exam:   General: Intubated, sedation on fentanyl and off versed    HEENT:    Neck: supple, trachea midline    Neuro:  Eyes closed, not opening to verbal or noxious stimuli, mute, pupils equal pinpoint 3mm slight reactive to light bilaterally, gaze midline  UE: no response to noxious stimuli  LE: no response to noxious stimuli  Tone supple all 4 extremities     CV: regular rate and rhythm    Pulm: clear to auscultation bilaterally     Abdomen; soft non tender, non distended, bowel sounds present in all quadrants    : not assessed    Skin: warm and dry, no lesions     MS: no muscle atrophy    -----------------------------------------------------------------------------------------------------    VITALS:   Vital Signs Last 24 Hrs  T(C): 37.4 (26 May 2025 12:53), Max: 37.4 (26 May 2025 12:53)  T(F): 99.4 (26 May 2025 12:53), Max: 99.4 (26 May 2025 12:53)  HR: 100 (26 May 2025 15:34) (92 - 100)  BP: 123/56 (26 May 2025 15:34) (123/56 - 180/86)  BP(mean): 80 (26 May 2025 15:34) (80 - 80)  RR: 18 (26 May 2025 15:34) (16 - 19)  SpO2: 98% (26 May 2025 15:34) (97% - 100%)    Parameters below as of 26 May 2025 15:34  Patient On (Oxygen Delivery Method): ventilator        Drips     Respiratory:  Mode: AC/ CMV (Assist Control/ Continuous Mandatory Ventilation)  RR (machine): 18  TV (machine): 450  FiO2: 40  PEEP: 6  ITime: 1  MAP: 10  PIP: 19        LABS:                        14.0   9.57  )-----------( 217      ( 26 May 2025 13:15 )             41.3     05-26    135  |  100  |  11  ----------------------------<  106[H]  3.7   |  19[L]  |  1.01      CAPILLARY BLOOD GLUCOSE      POCT Blood Glucose.: 116 mg/dL (26 May 2025 13:12)      I&O's Summary      Imaging   CXR     EKG     MEDICATION LEVELS:     IVF FLUIDS/MEDICATIONS:   MEDICATIONS  (STANDING):  chlorhexidine 0.12% Liquid 15 milliLiter(s) Oral Mucosa every 12 hours  fentaNYL   Infusion... 0.5 MICROgram(s)/kG/Hr (2.13 mL/Hr) IV Continuous <Continuous>  midazolam Infusion 0.02 mG/kG/Hr (1.8 mL/Hr) IV Continuous <Continuous>    MEDICATIONS  (PRN):

## 2025-05-27 NOTE — PROGRESS NOTE ADULT - SUBJECTIVE AND OBJECTIVE BOX
Patient seen and examined at bedside.    --Anticoagulation--    T(C): 37.2 (05-27-25 @ 03:00), Max: 37.4 (05-26-25 @ 12:53)  HR: 74 (05-27-25 @ 04:00) (74 - 105)  BP: 133/64 (05-27-25 @ 04:00) (86/54 - 180/86)  RR: 14 (05-27-25 @ 04:00) (13 - 19)  SpO2: 100% (05-27-25 @ 04:00) (97% - 100%)  Wt(kg): --    Exam: Intubated, no EO, pupils irregular but reactive, grimaces, Ox0, +cough/gag/OB, + R corneal, not FC, ERICKSON spont AG

## 2025-05-27 NOTE — PROGRESS NOTE ADULT - SUBJECTIVE AND OBJECTIVE BOX
Surgery Progress Note    SUBJECTIVE: Pt seen and examined at bedside. Critically ill, intubated, nonresponsive while sedated    Vital Signs Last 24 Hrs  T(C): 36.7 (27 May 2025 07:00), Max: 37.4 (26 May 2025 12:53)  T(F): 98.1 (27 May 2025 07:00), Max: 99.4 (26 May 2025 12:53)  HR: 68 (27 May 2025 09:25) (68 - 105)  BP: 143/68 (27 May 2025 09:00) (86/54 - 180/86)  BP(mean): 98 (27 May 2025 09:00) (66 - 102)  RR: 17 (27 May 2025 09:00) (13 - 26)  SpO2: 100% (27 May 2025 09:25) (97% - 100%)    Parameters below as of 27 May 2025 07:00  Patient On (Oxygen Delivery Method): ventilator    Physical Exam:  General Appearance: critically ill, nonresponsive, sedated   HEENT: head wrapped, ICP monitor 12-13mmHg  Respiratory: ETT, mech vent   CV: Pulse regularly present  Abdomen: Soft, nontender     LABS:                        14.0   9.57  )-----------( 217      ( 26 May 2025 13:15 )             41.3     05-27    135  |  106  |  12  ----------------------------<  134[H]  4.8   |  20[L]  |  0.83    Ca    7.7[L]      27 May 2025 06:41    TPro  7.1  /  Alb  4.2  /  TBili  0.5  /  DBili  x   /  AST  24  /  ALT  17  /  AlkPhos  60  05-26    PT/INR - ( 26 May 2025 13:15 )   PT: 11.5 sec;   INR: 1.01 ratio         PTT - ( 26 May 2025 13:15 )  PTT:26.5 sec  Urinalysis Basic - ( 27 May 2025 06:41 )    Color: x / Appearance: x / SG: x / pH: x  Gluc: 134 mg/dL / Ketone: x  / Bili: x / Urobili: x   Blood: x / Protein: x / Nitrite: x   Leuk Esterase: x / RBC: x / WBC x   Sq Epi: x / Non Sq Epi: x / Bacteria: x        INs and OUTs:    05-26-25 @ 07:01  -  05-27-25 @ 07:00  --------------------------------------------------------  IN: 1542.5 mL / OUT: 1000 mL / NET: 542.5 mL    05-27-25 @ 07:01  -  05-27-25 @ 11:05  --------------------------------------------------------  IN: 204.3 mL / OUT: 0 mL / NET: 204.3 mL
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
## 2025-05-28 LAB
A1C WITH ESTIMATED AVERAGE GLUCOSE RESULT: 5.8 % — HIGH (ref 4–5.6)
ANION GAP SERPL CALC-SCNC: 11 MMOL/L — SIGNIFICANT CHANGE UP (ref 5–17)
ANION GAP SERPL CALC-SCNC: 13 MMOL/L — SIGNIFICANT CHANGE UP (ref 5–17)
BUN SERPL-MCNC: 10 MG/DL — SIGNIFICANT CHANGE UP (ref 7–23)
BUN SERPL-MCNC: 12 MG/DL — SIGNIFICANT CHANGE UP (ref 7–23)
CALCIUM SERPL-MCNC: 8.1 MG/DL — LOW (ref 8.4–10.5)
CALCIUM SERPL-MCNC: 8.2 MG/DL — LOW (ref 8.4–10.5)
CHLORIDE SERPL-SCNC: 104 MMOL/L — SIGNIFICANT CHANGE UP (ref 96–108)
CHLORIDE SERPL-SCNC: 107 MMOL/L — SIGNIFICANT CHANGE UP (ref 96–108)
CO2 SERPL-SCNC: 23 MMOL/L — SIGNIFICANT CHANGE UP (ref 22–31)
CO2 SERPL-SCNC: 23 MMOL/L — SIGNIFICANT CHANGE UP (ref 22–31)
CREAT SERPL-MCNC: 0.59 MG/DL — SIGNIFICANT CHANGE UP (ref 0.5–1.3)
CREAT SERPL-MCNC: 0.75 MG/DL — SIGNIFICANT CHANGE UP (ref 0.5–1.3)
CULTURE RESULTS: SIGNIFICANT CHANGE UP
EGFR: 92 ML/MIN/1.73M2 — SIGNIFICANT CHANGE UP
EGFR: 92 ML/MIN/1.73M2 — SIGNIFICANT CHANGE UP
EGFR: 99 ML/MIN/1.73M2 — SIGNIFICANT CHANGE UP
EGFR: 99 ML/MIN/1.73M2 — SIGNIFICANT CHANGE UP
ESTIMATED AVERAGE GLUCOSE: 120 MG/DL — HIGH (ref 68–114)
GLUCOSE SERPL-MCNC: 158 MG/DL — HIGH (ref 70–99)
GLUCOSE SERPL-MCNC: 188 MG/DL — HIGH (ref 70–99)
HCT VFR BLD CALC: 33.8 % — LOW (ref 39–50)
HGB BLD-MCNC: 11.2 G/DL — LOW (ref 13–17)
MAGNESIUM SERPL-MCNC: 2.1 MG/DL — SIGNIFICANT CHANGE UP (ref 1.6–2.6)
MCHC RBC-ENTMCNC: 30.8 PG — SIGNIFICANT CHANGE UP (ref 27–34)
MCHC RBC-ENTMCNC: 33.1 G/DL — SIGNIFICANT CHANGE UP (ref 32–36)
MCV RBC AUTO: 92.9 FL — SIGNIFICANT CHANGE UP (ref 80–100)
NRBC BLD AUTO-RTO: 0 /100 WBCS — SIGNIFICANT CHANGE UP (ref 0–0)
PHOSPHATE SERPL-MCNC: 2.1 MG/DL — LOW (ref 2.5–4.5)
PLATELET # BLD AUTO: 133 K/UL — LOW (ref 150–400)
POTASSIUM SERPL-MCNC: 3.5 MMOL/L — SIGNIFICANT CHANGE UP (ref 3.5–5.3)
POTASSIUM SERPL-MCNC: 3.9 MMOL/L — SIGNIFICANT CHANGE UP (ref 3.5–5.3)
POTASSIUM SERPL-SCNC: 3.5 MMOL/L — SIGNIFICANT CHANGE UP (ref 3.5–5.3)
POTASSIUM SERPL-SCNC: 3.9 MMOL/L — SIGNIFICANT CHANGE UP (ref 3.5–5.3)
RBC # BLD: 3.64 M/UL — LOW (ref 4.2–5.8)
RBC # FLD: 11.9 % — SIGNIFICANT CHANGE UP (ref 10.3–14.5)
SODIUM SERPL-SCNC: 140 MMOL/L — SIGNIFICANT CHANGE UP (ref 135–145)
SODIUM SERPL-SCNC: 141 MMOL/L — SIGNIFICANT CHANGE UP (ref 135–145)
SPECIMEN SOURCE: SIGNIFICANT CHANGE UP
WBC # BLD: 11.21 K/UL — HIGH (ref 3.8–10.5)
WBC # FLD AUTO: 11.21 K/UL — HIGH (ref 3.8–10.5)

## 2025-05-28 RX ORDER — POTASSIUM PHOSPHATE, MONOBASIC POTASSIUM PHOSPHATE, DIBASIC INJECTION, 236; 224 MG/ML; MG/ML
30 SOLUTION, CONCENTRATE INTRAVENOUS ONCE
Refills: 0 | Status: COMPLETED | OUTPATIENT
Start: 2025-05-28 | End: 2025-05-28

## 2025-05-28 RX ORDER — DOXAZOSIN MESYLATE 8 MG/1
8 TABLET ORAL AT BEDTIME
Refills: 0 | Status: DISCONTINUED | OUTPATIENT
Start: 2025-05-28 | End: 2025-06-05

## 2025-05-28 RX ORDER — HEPARIN SODIUM 1000 [USP'U]/ML
5000 INJECTION INTRAVENOUS; SUBCUTANEOUS EVERY 8 HOURS
Refills: 0 | Status: DISCONTINUED | OUTPATIENT
Start: 2025-05-28 | End: 2025-05-28

## 2025-05-28 RX ORDER — QUETIAPINE FUMARATE 25 MG/1
25 TABLET ORAL EVERY 6 HOURS
Refills: 0 | Status: DISCONTINUED | OUTPATIENT
Start: 2025-05-28 | End: 2025-05-28

## 2025-05-28 RX ORDER — LEVETIRACETAM 10 MG/ML
500 INJECTION, SOLUTION INTRAVENOUS
Refills: 0 | Status: DISCONTINUED | OUTPATIENT
Start: 2025-05-28 | End: 2025-05-30

## 2025-05-28 RX ORDER — FENTANYL CITRATE-0.9 % NACL/PF 100MCG/2ML
12.5 SYRINGE (ML) INTRAVENOUS ONCE
Refills: 0 | Status: DISCONTINUED | OUTPATIENT
Start: 2025-05-28 | End: 2025-05-28

## 2025-05-28 RX ORDER — TAMSULOSIN HYDROCHLORIDE 0.4 MG/1
0.8 CAPSULE ORAL AT BEDTIME
Refills: 0 | Status: DISCONTINUED | OUTPATIENT
Start: 2025-05-28 | End: 2025-05-28

## 2025-05-28 RX ORDER — ENOXAPARIN SODIUM 100 MG/ML
40 INJECTION SUBCUTANEOUS EVERY 24 HOURS
Refills: 0 | Status: DISCONTINUED | OUTPATIENT
Start: 2025-05-28 | End: 2025-05-28

## 2025-05-28 RX ORDER — QUETIAPINE FUMARATE 25 MG/1
25 TABLET ORAL EVERY 6 HOURS
Refills: 0 | Status: DISCONTINUED | OUTPATIENT
Start: 2025-05-28 | End: 2025-05-29

## 2025-05-28 RX ADMIN — DEXMEDETOMIDINE HYDROCHLORIDE IN SODIUM CHLORIDE 16.3 MICROGRAM(S)/KG/HR: 4 INJECTION INTRAVENOUS at 21:33

## 2025-05-28 RX ADMIN — ATORVASTATIN CALCIUM 80 MILLIGRAM(S): 80 TABLET, FILM COATED ORAL at 21:34

## 2025-05-28 RX ADMIN — SODIUM CHLORIDE 100 MILLILITER(S): 3 INJECTION, SOLUTION INTRAVENOUS at 07:50

## 2025-05-28 RX ADMIN — Medication 12.5 MICROGRAM(S): at 13:54

## 2025-05-28 RX ADMIN — IPRATROPIUM BROMIDE AND ALBUTEROL SULFATE 3 MILLILITER(S): .5; 2.5 SOLUTION RESPIRATORY (INHALATION) at 05:11

## 2025-05-28 RX ADMIN — Medication 2 TABLET(S): at 21:34

## 2025-05-28 RX ADMIN — IPRATROPIUM BROMIDE AND ALBUTEROL SULFATE 3 MILLILITER(S): .5; 2.5 SOLUTION RESPIRATORY (INHALATION) at 23:43

## 2025-05-28 RX ADMIN — IPRATROPIUM BROMIDE AND ALBUTEROL SULFATE 3 MILLILITER(S): .5; 2.5 SOLUTION RESPIRATORY (INHALATION) at 11:31

## 2025-05-28 RX ADMIN — Medication 20 MILLIEQUIVALENT(S): at 23:37

## 2025-05-28 RX ADMIN — BUDESONIDE 0.25 MILLIGRAM(S): 0.25 SUSPENSION RESPIRATORY (INHALATION) at 17:55

## 2025-05-28 RX ADMIN — LEVETIRACETAM 500 MILLIGRAM(S): 10 INJECTION, SOLUTION INTRAVENOUS at 17:31

## 2025-05-28 RX ADMIN — DEXMEDETOMIDINE HYDROCHLORIDE IN SODIUM CHLORIDE 16.3 MICROGRAM(S)/KG/HR: 4 INJECTION INTRAVENOUS at 23:37

## 2025-05-28 RX ADMIN — SODIUM CHLORIDE 100 MILLILITER(S): 3 INJECTION, SOLUTION INTRAVENOUS at 21:33

## 2025-05-28 RX ADMIN — POLYETHYLENE GLYCOL 3350 17 GRAM(S): 17 POWDER, FOR SOLUTION ORAL at 06:02

## 2025-05-28 RX ADMIN — Medication 20 MILLIEQUIVALENT(S): at 06:02

## 2025-05-28 RX ADMIN — QUETIAPINE FUMARATE 25 MILLIGRAM(S): 25 TABLET ORAL at 17:31

## 2025-05-28 RX ADMIN — POTASSIUM PHOSPHATE, MONOBASIC POTASSIUM PHOSPHATE, DIBASIC INJECTION, 83.33 MILLIMOLE(S): 236; 224 SOLUTION, CONCENTRATE INTRAVENOUS at 06:02

## 2025-05-28 RX ADMIN — POLYETHYLENE GLYCOL 3350 17 GRAM(S): 17 POWDER, FOR SOLUTION ORAL at 17:31

## 2025-05-28 RX ADMIN — BUDESONIDE 0.25 MILLIGRAM(S): 0.25 SUSPENSION RESPIRATORY (INHALATION) at 05:10

## 2025-05-28 RX ADMIN — DEXMEDETOMIDINE HYDROCHLORIDE IN SODIUM CHLORIDE 16.3 MICROGRAM(S)/KG/HR: 4 INJECTION INTRAVENOUS at 07:50

## 2025-05-28 RX ADMIN — Medication 1 APPLICATION(S): at 21:33

## 2025-05-28 RX ADMIN — Medication 20 MILLIEQUIVALENT(S): at 21:37

## 2025-05-28 RX ADMIN — IPRATROPIUM BROMIDE AND ALBUTEROL SULFATE 3 MILLILITER(S): .5; 2.5 SOLUTION RESPIRATORY (INHALATION) at 17:55

## 2025-05-28 RX ADMIN — DOXAZOSIN MESYLATE 8 MILLIGRAM(S): 8 TABLET ORAL at 21:34

## 2025-05-28 RX ADMIN — Medication 12.5 MICROGRAM(S): at 14:24

## 2025-05-28 RX ADMIN — DEXMEDETOMIDINE HYDROCHLORIDE IN SODIUM CHLORIDE 16.3 MICROGRAM(S)/KG/HR: 4 INJECTION INTRAVENOUS at 21:38

## 2025-05-28 RX ADMIN — QUETIAPINE FUMARATE 25 MILLIGRAM(S): 25 TABLET ORAL at 23:37

## 2025-05-28 RX ADMIN — LEVETIRACETAM 400 MILLIGRAM(S): 10 INJECTION, SOLUTION INTRAVENOUS at 06:02

## 2025-05-28 RX ADMIN — QUETIAPINE FUMARATE 25 MILLIGRAM(S): 25 TABLET ORAL at 11:14

## 2025-05-28 NOTE — DIETITIAN INITIAL EVALUATION ADULT - ADD RECOMMEND
1. Obtain further subjective wt/diet history from pt/family PRN.   2. Monitor GI tolerance. RD to remain available to adjust EN formulary, volume/rate PRN.   3. Recommend multivitamin (if no medication contraindications) to aid in prevention of micronutrient deficiencies.   4. Monitor wt trends/labs/skin integrity/hydration status/bowel regularity.   5. Defer advancement of diet to medical team; if PO diet warranted, consider NO therapeutic restrictions and defer consistency/texture to medical team, SLP.

## 2025-05-28 NOTE — DIETITIAN INITIAL EVALUATION ADULT - ENTERAL
Recommend increasing goal rate to Jevity 1.5 at 85ml/hr x 18 hrs. Based on  lbs/70kg, provides: 1530ml, 2295kcal (32.8kcal/kg) and 98g protein (1.4g protein/kg).

## 2025-05-28 NOTE — PROGRESS NOTE ADULT - SUBJECTIVE AND OBJECTIVE BOX
Patient seen and examined at bedside.    --Anticoagulation--    T(C): 36.9 (05-27-25 @ 19:00), Max: 37.6 (05-27-25 @ 15:00)  HR: 88 (05-27-25 @ 23:55) (64 - 102)  BP: 180/78 (05-27-25 @ 22:00) (118/57 - 182/77)  RR: 15 (05-27-25 @ 22:00) (11 - 26)  SpO2: 100% (05-27-25 @ 23:55) (97% - 100%)  Wt(kg): --    Exam: Extubated, no EO, pupils irregular but reactive, + R corneal, not FC, ERICKSON spont AG

## 2025-05-28 NOTE — EEG REPORT - NS EEG TEXT BOX
REPORT OF CONTINUOUS VIDEO EEG    [facility]: [address] [facility] [address], [place] [address], Phone: [phone]  [facility]: [address] [place] [address], Phone: [phone]  [facility]: [address] [place] [address], Phone: [phone]    Patient Name: Eduin Tomlin   Age: 79 year, : 1945  Wang: [place] [address]  Referring Physician: -    Study Start: 2025	08:00      Study End:  2025	08:00.   Study Duration: 24 hr      -------------------------------------------------------------------------------------------------------  EEG Recording Technique:  The patient underwent continuous Video-EEG monitoring, using Telemetry System hardware on the XLTek Digital System. EEG and video data were stored on a computer hard drive with important events saved in digital archive files. The material was reviewed by a physician (electroencephalographer / epileptologist) on a daily basis. Kalen and seizure detection algorithms were utilized and reviewed. An EEG Technician attended to the patient, and was available throughout daytime work hours.  The epilepsy center neurologist was available in person or on call 24-hours per day.    EEG Placement and Labeling of Electrodes:  The EEG was performed utilizing 20 channel referential EEG connections (coronal over temporal over parasagittal montage) using all standard 10-20 electrode placements with EKG, with additional electrodes placed in the inferior temporal region using the modified 10-10 montage electrode placements for elective admissions, or if deemed necessary. Recording was at a sampling rate of 256 samples per second per channel. Time synchronized digital video recording was done simultaneously with EEG recording. A low light infrared camera was used for low light recording.     -------------------------------------------------------------------------------------------------------  History: -  79 year old Male with AMS is here for evaluation    Medication  KEPPRA IVPB      -------------------------------------------------------------------------------------------------------  Interpretation:    [[[Abbreviation Key:  PDR=alpha rhythm/posterior dominant rhythm. A-P=anterior posterior.  Amplitude: ‘very low’:<20; ‘low’:20-49; ‘medium’:; ‘high’:>150uV.  Persistence for periodic/rhythmic patterns (% of epoch) ‘rare’:<1%; ‘occasional’:1-10%; ‘frequent’:10-50%; ‘abundant’:50-90%; ‘continuous’:>90%.  Persistence for sporadic discharges: ‘rare’:<1/hr; ‘occasional’:1/min-1/hr; ‘frequent’:>1/min; ‘abundant’:>1/10 sec.  RPP=rhythmic and periodic patterns; GRDA=generalized rhythmic delta activity; FIRDA=frontal intermittent GRDA; LRDA=lateralized rhythmic delta activity; TIRDA=temporal intermittent rhythmic delta activity;  LPD=PLED=lateralized periodic discharges; GPD=generalized periodic discharges; BIPDs =bilateral independent periodic discharges; Mf=multifocal; SIRPDs=stimulus induced rhythmic, periodic, or ictal appearing discharges; BIRDs=brief potentially ictal rhythmic discharges >4 Hz, lasting .5-10s; PFA (paroxysmal bursts >13 Hz or =8 Hz <10s).  Modifiers: +F=with fast component; +S=with spike component; +R=with rhythmic component.  S-B=burst suppression pattern.  Max=maximal. N1-drowsy; N2-stage II sleep; N3-slow wave sleep. SSS/BETS=small sharp spikes/benign epileptiform transients of sleep. HV=hyperventilation; PS=photic stimulation]]]    FINDINGS:      Background:  SYMMETRY: symmetric  CONTINUOUS: continuous  PDR: unclear except for fragments of poorly organized 8 Hz  REACTIVITY: present  VOLTAGE: normal, [defined typically between 20-150uV]  AP GRADIENT: absent  BREACH: absent  OTHER: none    GENERALIZED SLOWING: present. Background is diffusely slow consisting of polymorphic delta > theta activity. D    FOCAL SLOWING: none was present.    State Changes:   Drowsiness was characterized by fragmentation, attenuation, and slowing of the background activity.    N2 sleep transients with symmetric spindles and poorly formed K-complexes.      Sporadic Epileptiform Discharges:   None    Rhythmic and Periodic Patterns (RPPs):  None     Electrographic and Electroclinical seizures:  None    Other Clinical Events:  None    Activation Procedures:   Hyperventilation was not performed.    Photic stimulation was not performed.      Artifacts:  Intermittent myogenic and movement artifacts were noted.    ECG:  The heart rate on single channel ECG was predominantly between 70-80 BPM.  -------------------------------------------------------------------------------------------------------  EEG Classification:    Abnormal EEG in sedated and intubated state  1. Moderate diffuse background slowing	    -------------------------------------------------------------------------------------------------------  EEG Impression / Clinical Correlate:    Abnormal prolonged EEG study due to Moderate diffuse cerebral dysfunction that is not specific in etiology    No epileptic discharges recorded.  No seizures recorded.      -------------------------------------------------------------------------------------------------------  ARIANNA Roe  Attending Physician, [facility]    ------------------------------------  EEG Reading Room: [phone]  On Call Service After Hours: [phone]        REPORT OF CONTINUOUS VIDEO EEG    [facility]: [address] [facility] [address], [place] [address], Phone: [phone]  [facility]: [address] [place] [address], Phone: [phone]  [facility]: [address] [place] [address], Phone: [phone]    Patient Name: Eduin Tomlin   Age: 79 year, : 1945  Wang: [place] [address]  Referring Physician: -    Study Start: 2025	08:00      Study End:  2025	08:00.   Study Duration: 24 hr      -------------------------------------------------------------------------------------------------------  EEG Recording Technique:  The patient underwent continuous Video-EEG monitoring, using Telemetry System hardware on the XLTek Digital System. EEG and video data were stored on a computer hard drive with important events saved in digital archive files. The material was reviewed by a physician (electroencephalographer / epileptologist) on a daily basis. Kalen and seizure detection algorithms were utilized and reviewed. An EEG Technician attended to the patient, and was available throughout daytime work hours.  The epilepsy center neurologist was available in person or on call 24-hours per day.    EEG Placement and Labeling of Electrodes:  The EEG was performed utilizing 20 channel referential EEG connections (coronal over temporal over parasagittal montage) using all standard 10-20 electrode placements with EKG, with additional electrodes placed in the inferior temporal region using the modified 10-10 montage electrode placements for elective admissions, or if deemed necessary. Recording was at a sampling rate of 256 samples per second per channel. Time synchronized digital video recording was done simultaneously with EEG recording. A low light infrared camera was used for low light recording.     -------------------------------------------------------------------------------------------------------  History: -  79 year old Male with AMS is here for evaluation    Medication  KEPPRA IVPB      -------------------------------------------------------------------------------------------------------  Interpretation:    [[[Abbreviation Key:  PDR=alpha rhythm/posterior dominant rhythm. A-P=anterior posterior.  Amplitude: ‘very low’:<20; ‘low’:20-49; ‘medium’:; ‘high’:>150uV.  Persistence for periodic/rhythmic patterns (% of epoch) ‘rare’:<1%; ‘occasional’:1-10%; ‘frequent’:10-50%; ‘abundant’:50-90%; ‘continuous’:>90%.  Persistence for sporadic discharges: ‘rare’:<1/hr; ‘occasional’:1/min-1/hr; ‘frequent’:>1/min; ‘abundant’:>1/10 sec.  RPP=rhythmic and periodic patterns; GRDA=generalized rhythmic delta activity; FIRDA=frontal intermittent GRDA; LRDA=lateralized rhythmic delta activity; TIRDA=temporal intermittent rhythmic delta activity;  LPD=PLED=lateralized periodic discharges; GPD=generalized periodic discharges; BIPDs =bilateral independent periodic discharges; Mf=multifocal; SIRPDs=stimulus induced rhythmic, periodic, or ictal appearing discharges; BIRDs=brief potentially ictal rhythmic discharges >4 Hz, lasting .5-10s; PFA (paroxysmal bursts >13 Hz or =8 Hz <10s).  Modifiers: +F=with fast component; +S=with spike component; +R=with rhythmic component.  S-B=burst suppression pattern.  Max=maximal. N1-drowsy; N2-stage II sleep; N3-slow wave sleep. SSS/BETS=small sharp spikes/benign epileptiform transients of sleep. HV=hyperventilation; PS=photic stimulation]]]    FINDINGS:      Background:  SYMMETRY: symmetric  CONTINUOUS: continuous  PDR: absent  REACTIVITY: present  VOLTAGE: normal, [defined typically between 20-150uV]  AP GRADIENT: absent  BREACH: absent  OTHER: none    GENERALIZED SLOWING: present. Background is diffusely slow consisting of polymorphic delta > theta activity.     FOCAL SLOWING: none was present.    State Changes:   Drowsiness was characterized by fragmentation, attenuation, and slowing of the background activity.    N2 sleep transients with symmetric spindles and poorly formed K-complexes.      Sporadic Epileptiform Discharges:   None    Rhythmic and Periodic Patterns (RPPs):  None     Electrographic and Electroclinical seizures:  None    Other Clinical Events:  None    Activation Procedures:   Hyperventilation was not performed.    Photic stimulation was not performed.      Artifacts:  Intermittent myogenic and movement artifacts were noted.    ECG:  The heart rate on single channel ECG was predominantly between 70-80 BPM.  -------------------------------------------------------------------------------------------------------  EEG Classification:    Abnormal EEG in sedated and intubated state  1. Moderate diffuse background slowing	    -------------------------------------------------------------------------------------------------------  EEG Impression / Clinical Correlate:    Abnormal prolonged EEG study due to Moderate diffuse cerebral dysfunction that is not specific in etiology    No epileptic discharges recorded.  No seizures recorded.      -------------------------------------------------------------------------------------------------------  ARIANNA Roe  Attending Physician, [facility]    ------------------------------------  EEG Reading Room: [phone]  On Call Service After Hours: [phone]

## 2025-05-28 NOTE — DIETITIAN INITIAL EVALUATION ADULT - PERTINENT LABORATORY DATA
05-28    140  |  104  |  10  ----------------------------<  158[H]  3.9   |  23  |  0.75    Ca    8.1[L]      28 May 2025 01:59  Phos  2.1     05-28  Mg     2.1     05-28    TPro  7.1  /  Alb  4.2  /  TBili  0.5  /  DBili  x   /  AST  24  /  ALT  17  /  AlkPhos  60  05-26  A1C with Estimated Average Glucose Result: 5.8 % (05-27-25 @ 12:16)

## 2025-05-28 NOTE — DIETITIAN INITIAL EVALUATION ADULT - OTHER INFO
Dosing wt (5/26): 204 lbs  Wt trend (per [facility]): (4/30): 194 lbs; (10/2/24): 198 lbs.   Wt appears stable, will continue to monitor/trend.

## 2025-05-28 NOTE — DIETITIAN INITIAL EVALUATION ADULT - PERTINENT MEDS FT
no diarrhea/no constipation/no nausea/no vomiting
MEDICATIONS  (STANDING):  albuterol/ipratropium for Nebulization 3 milliLiter(s) Nebulizer every 6 hours  atorvastatin 80 milliGRAM(s) Oral at bedtime  buDESOnide    Inhalation Suspension 0.25 milliGRAM(s) Inhalation every 12 hours  chlorhexidine 4% Liquid 1 Application(s) Topical <User Schedule>  dexMEDEtomidine Infusion 0.7 MICROgram(s)/kG/Hr (16.3 mL/Hr) IV Continuous <Continuous>  levETIRAcetam  IVPB 500 milliGRAM(s) IV Intermittent two times a day  pantoprazole  Injectable 40 milliGRAM(s) IV Push daily  polyethylene glycol 3350 17 Gram(s) Oral two times a day  senna 2 Tablet(s) Oral at bedtime  sodium chloride 2% + sodium acetate 50:50 1000 milliLiter(s) (100 mL/Hr) IV Continuous <Continuous>    MEDICATIONS  (PRN):
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
## 2025-05-28 NOTE — DIETITIAN INITIAL EVALUATION ADULT - NS FNS DIET ORDER
Diet, NPO:   Tube Feeding Modality: Nasogastric  Jevity 1.5 Adonis (JEVITY1.5RTH)  Total Volume for 24 Hours (mL): 1080  Continuous  Starting Tube Feed Rate {mL per Hour}: 20  Increase Tube Feed Rate by (mL): 10     Every 4 hours  Until Goal Tube Feed Rate (mL per Hour): 60  Tube Feed Duration (in Hours): 18  Tube Feed Start Time: 11:00 (05-27-25 @ 10:13)

## 2025-05-28 NOTE — PROGRESS NOTE ADULT - ASSESSMENT
mri with contrast abdome for splenic findings on ct scan when stable    D/C [place] later today    Gen Surg consult for T/P     PbO2 >20    vEEG in progress    TTE-p

## 2025-05-28 NOTE — DIETITIAN INITIAL EVALUATION ADULT - ORAL INTAKE PTA/DIET HISTORY
-Due to neurological injury, patient unable to participate in nutrition evaluation. No family present at bedside.   -Baseline tolerance to chewing/swallowing and baseline provision of energy unclear. Allergy status unknown.  -No indication of prior vitamin/supplement intake prior to admission.

## 2025-05-28 NOTE — DIETITIAN INITIAL EVALUATION ADULT - NSFNSGIIOFT_GEN_A_CORE
-Last BM PTA. Passing flatus. On bowel regimen (senna, Miralax).   -Receiving NaCl 2% with sodium acetate 50:50 infusing at 100ml/hr. Sodium goal determined by neurosurgical team.  -Remains on Precedex, infusing at 7ml/hr.
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
## 2025-05-28 NOTE — PROGRESS NOTE ADULT - SUBJECTIVE AND OBJECTIVE BOX
NSICU PROGRESS NOTE     Please see resident H&P for HPI.     Admission score: GCS 8     12h events   admitted to NSICU post TBI     -----------------------------------------------------------------------------------------------------  Exam:   General: Intubated, sedation on fentanyl and off versed    HEENT:    Neck: supple, trachea midline    Neuro:  Eyes closed, not opening to verbal or noxious stimuli, mute, pupils equal pinpoint 3mm slight reactive to light bilaterally, gaze midline  UE: no response to noxious stimuli  LE: no response to noxious stimuli  Tone supple all 4 extremities     CV: regular rate and rhythm    Pulm: clear to auscultation bilaterally     Abdomen; soft non tender, non distended, bowel sounds present in all quadrants    : not assessed    Skin: warm and dry, no lesions     MS: no muscle atrophy    -----------------------------------------------------------------------------------------------------    VITALS:   Vital Signs Last 24 Hrs  T(C): 37.4 (26 May 2025 12:53), Max: 37.4 (26 May 2025 12:53)  T(F): 99.4 (26 May 2025 12:53), Max: 99.4 (26 May 2025 12:53)  HR: 100 (26 May 2025 15:34) (92 - 100)  BP: 123/56 (26 May 2025 15:34) (123/56 - 180/86)  BP(mean): 80 (26 May 2025 15:34) (80 - 80)  RR: 18 (26 May 2025 15:34) (16 - 19)  SpO2: 98% (26 May 2025 15:34) (97% - 100%)    Parameters below as of 26 May 2025 15:34  Patient On (Oxygen Delivery Method): ventilator        Drips     Respiratory:  Mode: AC/ CMV (Assist Control/ Continuous Mandatory Ventilation)  RR (machine): 18  TV (machine): 450  FiO2: 40  PEEP: 6  ITime: 1  MAP: 10  PIP: 19        LABS:                        14.0   9.57  )-----------( 217      ( 26 May 2025 13:15 )             41.3     05-26    135  |  100  |  11  ----------------------------<  106[H]  3.7   |  19[L]  |  1.01      CAPILLARY BLOOD GLUCOSE      POCT Blood Glucose.: 116 mg/dL (26 May 2025 13:12)      I&O's Summary      Imaging   CXR     EKG     MEDICATION LEVELS:     IVF FLUIDS/MEDICATIONS:   MEDICATIONS  (STANDING):  chlorhexidine 0.12% Liquid 15 milliLiter(s) Oral Mucosa every 12 hours  fentaNYL   Infusion... 0.5 MICROgram(s)/kG/Hr (2.13 mL/Hr) IV Continuous <Continuous>  midazolam Infusion 0.02 mG/kG/Hr (1.8 mL/Hr) IV Continuous <Continuous>    MEDICATIONS  (PRN):     NSICU PROGRESS NOTE     Please see resident H&P for HPI.     Admission score: GCS 8     12h events   admitted to NSICU post TBI     -----------------------------------------------------------------------------------------------------  Exam:   General: Intubated, sedation on fentanyl and off versed    HEENT:    Neck: supple, trachea midline    Neuro:  Eyes closed,slight opening to verbal, mute, PEEARL, gaze midline  UE: no response to noxious stimuli  LE: no response to noxious stimuli  Tone supple all 4 extremities     CV: regular rate and rhythm    Pulm: clear to auscultation bilaterally     Abdomen; soft non tender, non distended, bowel sounds present in all quadrants    : not assessed    Skin: warm and dry, no lesions     MS: no muscle atrophy    -----------------------------------------------------------------------------------------------------    VITALS:   Vital Signs Last 24 Hrs  T(C): 37.4 (26 May 2025 12:53), Max: 37.4 (26 May 2025 12:53)  T(F): 99.4 (26 May 2025 12:53), Max: 99.4 (26 May 2025 12:53)  HR: 100 (26 May 2025 15:34) (92 - 100)  BP: 123/56 (26 May 2025 15:34) (123/56 - 180/86)  BP(mean): 80 (26 May 2025 15:34) (80 - 80)  RR: 18 (26 May 2025 15:34) (16 - 19)  SpO2: 98% (26 May 2025 15:34) (97% - 100%)    Parameters below as of 26 May 2025 15:34  Patient On (Oxygen Delivery Method): ventilator        Drips     Respiratory:  Mode: AC/ CMV (Assist Control/ Continuous Mandatory Ventilation)  RR (machine): 18  TV (machine): 450  FiO2: 40  PEEP: 6  ITime: 1  MAP: 10  PIP: 19        LABS:                        14.0   9.57  )-----------( 217      ( 26 May 2025 13:15 )             41.3     05-26    135  |  100  |  11  ----------------------------<  106[H]  3.7   |  19[L]  |  1.01      CAPILLARY BLOOD GLUCOSE      POCT Blood Glucose.: 116 mg/dL (26 May 2025 13:12)      I&O's Summary      Imaging   CXR     EKG     MEDICATION LEVELS:     IVF FLUIDS/MEDICATIONS:   MEDICATIONS  (STANDING):  chlorhexidine 0.12% Liquid 15 milliLiter(s) Oral Mucosa every 12 hours  fentaNYL   Infusion... 0.5 MICROgram(s)/kG/Hr (2.13 mL/Hr) IV Continuous <Continuous>  midazolam Infusion 0.02 mG/kG/Hr (1.8 mL/Hr) IV Continuous <Continuous>    MEDICATIONS  (PRN):

## 2025-05-29 LAB
ANION GAP SERPL CALC-SCNC: 13 MMOL/L — SIGNIFICANT CHANGE UP (ref 5–17)
ANION GAP SERPL CALC-SCNC: 9 MMOL/L — SIGNIFICANT CHANGE UP (ref 5–17)
APPEARANCE UR: CLEAR — SIGNIFICANT CHANGE UP
BACTERIA # UR AUTO: NEGATIVE /HPF — SIGNIFICANT CHANGE UP
BILIRUB UR-MCNC: NEGATIVE — SIGNIFICANT CHANGE UP
BUN SERPL-MCNC: 12 MG/DL — SIGNIFICANT CHANGE UP (ref 7–23)
BUN SERPL-MCNC: 9 MG/DL — SIGNIFICANT CHANGE UP (ref 7–23)
CALCIUM SERPL-MCNC: 8.4 MG/DL — SIGNIFICANT CHANGE UP (ref 8.4–10.5)
CALCIUM SERPL-MCNC: 8.6 MG/DL — SIGNIFICANT CHANGE UP (ref 8.4–10.5)
CAST: 0 /LPF — SIGNIFICANT CHANGE UP (ref 0–4)
CHLORIDE SERPL-SCNC: 106 MMOL/L — SIGNIFICANT CHANGE UP (ref 96–108)
CHLORIDE SERPL-SCNC: 109 MMOL/L — HIGH (ref 96–108)
CK SERPL-CCNC: 352 U/L — HIGH (ref 30–200)
CO2 SERPL-SCNC: 23 MMOL/L — SIGNIFICANT CHANGE UP (ref 22–31)
CO2 SERPL-SCNC: 24 MMOL/L — SIGNIFICANT CHANGE UP (ref 22–31)
COLOR SPEC: YELLOW — SIGNIFICANT CHANGE UP
CREAT SERPL-MCNC: 0.68 MG/DL — SIGNIFICANT CHANGE UP (ref 0.5–1.3)
CREAT SERPL-MCNC: 0.7 MG/DL — SIGNIFICANT CHANGE UP (ref 0.5–1.3)
DIFF PNL FLD: ABNORMAL
EGFR: 94 ML/MIN/1.73M2 — SIGNIFICANT CHANGE UP
EGFR: 94 ML/MIN/1.73M2 — SIGNIFICANT CHANGE UP
EGFR: 95 ML/MIN/1.73M2 — SIGNIFICANT CHANGE UP
EGFR: 95 ML/MIN/1.73M2 — SIGNIFICANT CHANGE UP
GLUCOSE SERPL-MCNC: 160 MG/DL — HIGH (ref 70–99)
GLUCOSE SERPL-MCNC: 173 MG/DL — HIGH (ref 70–99)
GLUCOSE UR QL: NEGATIVE MG/DL — SIGNIFICANT CHANGE UP
HCT VFR BLD CALC: 34.3 % — LOW (ref 39–50)
HGB BLD-MCNC: 11.8 G/DL — LOW (ref 13–17)
KETONES UR QL: NEGATIVE MG/DL — SIGNIFICANT CHANGE UP
LACTATE SERPL-SCNC: 3.3 MMOL/L — HIGH (ref 0.5–2)
LEUKOCYTE ESTERASE UR-ACNC: NEGATIVE — SIGNIFICANT CHANGE UP
MAGNESIUM SERPL-MCNC: 2.1 MG/DL — SIGNIFICANT CHANGE UP (ref 1.6–2.6)
MAGNESIUM SERPL-MCNC: 2.2 MG/DL — SIGNIFICANT CHANGE UP (ref 1.6–2.6)
MCHC RBC-ENTMCNC: 31.7 PG — SIGNIFICANT CHANGE UP (ref 27–34)
MCHC RBC-ENTMCNC: 34.4 G/DL — SIGNIFICANT CHANGE UP (ref 32–36)
MCV RBC AUTO: 92.2 FL — SIGNIFICANT CHANGE UP (ref 80–100)
NITRITE UR-MCNC: NEGATIVE — SIGNIFICANT CHANGE UP
NRBC BLD AUTO-RTO: 0 /100 WBCS — SIGNIFICANT CHANGE UP (ref 0–0)
PH UR: 8.5 (ref 5–8)
PHOSPHATE SERPL-MCNC: 1.4 MG/DL — LOW (ref 2.5–4.5)
PHOSPHATE SERPL-MCNC: 2 MG/DL — LOW (ref 2.5–4.5)
PLATELET # BLD AUTO: 166 K/UL — SIGNIFICANT CHANGE UP (ref 150–400)
POTASSIUM SERPL-MCNC: 3.2 MMOL/L — LOW (ref 3.5–5.3)
POTASSIUM SERPL-MCNC: 4.1 MMOL/L — SIGNIFICANT CHANGE UP (ref 3.5–5.3)
POTASSIUM SERPL-SCNC: 3.2 MMOL/L — LOW (ref 3.5–5.3)
POTASSIUM SERPL-SCNC: 4.1 MMOL/L — SIGNIFICANT CHANGE UP (ref 3.5–5.3)
PROT UR-MCNC: NEGATIVE MG/DL — SIGNIFICANT CHANGE UP
RBC # BLD: 3.72 M/UL — LOW (ref 4.2–5.8)
RBC # FLD: 12 % — SIGNIFICANT CHANGE UP (ref 10.3–14.5)
RBC CASTS # UR COMP ASSIST: 6 /HPF — HIGH (ref 0–4)
SODIUM SERPL-SCNC: 142 MMOL/L — SIGNIFICANT CHANGE UP (ref 135–145)
SODIUM SERPL-SCNC: 142 MMOL/L — SIGNIFICANT CHANGE UP (ref 135–145)
SP GR SPEC: 1.01 — SIGNIFICANT CHANGE UP (ref 1–1.03)
SQUAMOUS # UR AUTO: 0 /HPF — SIGNIFICANT CHANGE UP (ref 0–5)
UROBILINOGEN FLD QL: 1 MG/DL — SIGNIFICANT CHANGE UP (ref 0.2–1)
WBC # BLD: 10.11 K/UL — SIGNIFICANT CHANGE UP (ref 3.8–10.5)
WBC # FLD AUTO: 10.11 K/UL — SIGNIFICANT CHANGE UP (ref 3.8–10.5)
WBC UR QL: 1 /HPF — SIGNIFICANT CHANGE UP (ref 0–5)

## 2025-05-29 RX ORDER — QUETIAPINE FUMARATE 25 MG/1
25 TABLET ORAL ONCE
Refills: 0 | Status: COMPLETED | OUTPATIENT
Start: 2025-05-29 | End: 2025-05-29

## 2025-05-29 RX ORDER — METOPROLOL SUCCINATE 50 MG/1
25 TABLET, EXTENDED RELEASE ORAL
Refills: 0 | Status: DISCONTINUED | OUTPATIENT
Start: 2025-05-29 | End: 2025-05-29

## 2025-05-29 RX ORDER — ACETAMINOPHEN 500 MG/5ML
650 LIQUID (ML) ORAL EVERY 6 HOURS
Refills: 0 | Status: DISCONTINUED | OUTPATIENT
Start: 2025-05-29 | End: 2025-05-31

## 2025-05-29 RX ORDER — SODIUM CHLORIDE 3 G/100ML
1000 INJECTION, SOLUTION INTRAVENOUS
Refills: 0 | Status: DISCONTINUED | OUTPATIENT
Start: 2025-05-29 | End: 2025-05-31

## 2025-05-29 RX ORDER — SODIUM CHLORIDE 3 G/100ML
1000 INJECTION, SOLUTION INTRAVENOUS
Refills: 0 | Status: DISCONTINUED | OUTPATIENT
Start: 2025-05-29 | End: 2025-05-30

## 2025-05-29 RX ORDER — HEPARIN SODIUM 1000 [USP'U]/ML
5000 INJECTION INTRAVENOUS; SUBCUTANEOUS EVERY 8 HOURS
Refills: 0 | Status: DISCONTINUED | OUTPATIENT
Start: 2025-05-29 | End: 2025-06-10

## 2025-05-29 RX ORDER — METOPROLOL SUCCINATE 50 MG/1
25 TABLET, EXTENDED RELEASE ORAL
Refills: 0 | Status: DISCONTINUED | OUTPATIENT
Start: 2025-05-29 | End: 2025-06-05

## 2025-05-29 RX ORDER — ACETAMINOPHEN 500 MG/5ML
1000 LIQUID (ML) ORAL ONCE
Refills: 0 | Status: COMPLETED | OUTPATIENT
Start: 2025-05-29 | End: 2025-05-29

## 2025-05-29 RX ORDER — FINASTERIDE 1 MG/1
5 TABLET, FILM COATED ORAL DAILY
Refills: 0 | Status: DISCONTINUED | OUTPATIENT
Start: 2025-05-29 | End: 2025-06-07

## 2025-05-29 RX ORDER — POTASSIUM PHOSPHATE, MONOBASIC POTASSIUM PHOSPHATE, DIBASIC INJECTION, 236; 224 MG/ML; MG/ML
30 SOLUTION, CONCENTRATE INTRAVENOUS ONCE
Refills: 0 | Status: COMPLETED | OUTPATIENT
Start: 2025-05-29 | End: 2025-05-29

## 2025-05-29 RX ORDER — POTASSIUM PHOSPHATE, MONOBASIC POTASSIUM PHOSPHATE, DIBASIC INJECTION, 236; 224 MG/ML; MG/ML
30 SOLUTION, CONCENTRATE INTRAVENOUS ONCE
Refills: 0 | Status: COMPLETED | OUTPATIENT
Start: 2025-05-29 | End: 2025-05-30

## 2025-05-29 RX ADMIN — IPRATROPIUM BROMIDE AND ALBUTEROL SULFATE 3 MILLILITER(S): .5; 2.5 SOLUTION RESPIRATORY (INHALATION) at 17:54

## 2025-05-29 RX ADMIN — Medication 650 MILLIGRAM(S): at 13:35

## 2025-05-29 RX ADMIN — Medication 400 MILLIGRAM(S): at 22:10

## 2025-05-29 RX ADMIN — Medication 650 MILLIGRAM(S): at 13:05

## 2025-05-29 RX ADMIN — QUETIAPINE FUMARATE 25 MILLIGRAM(S): 25 TABLET ORAL at 14:09

## 2025-05-29 RX ADMIN — LEVETIRACETAM 500 MILLIGRAM(S): 10 INJECTION, SOLUTION INTRAVENOUS at 18:27

## 2025-05-29 RX ADMIN — SODIUM CHLORIDE 50 MILLILITER(S): 3 INJECTION, SOLUTION INTRAVENOUS at 21:42

## 2025-05-29 RX ADMIN — Medication 20 MILLIEQUIVALENT(S): at 01:19

## 2025-05-29 RX ADMIN — HEPARIN SODIUM 5000 UNIT(S): 1000 INJECTION INTRAVENOUS; SUBCUTANEOUS at 21:31

## 2025-05-29 RX ADMIN — SODIUM CHLORIDE 100 MILLILITER(S): 3 INJECTION, SOLUTION INTRAVENOUS at 07:43

## 2025-05-29 RX ADMIN — IPRATROPIUM BROMIDE AND ALBUTEROL SULFATE 3 MILLILITER(S): .5; 2.5 SOLUTION RESPIRATORY (INHALATION) at 11:13

## 2025-05-29 RX ADMIN — DEXMEDETOMIDINE HYDROCHLORIDE IN SODIUM CHLORIDE 16.3 MICROGRAM(S)/KG/HR: 4 INJECTION INTRAVENOUS at 00:24

## 2025-05-29 RX ADMIN — Medication 2 TABLET(S): at 21:31

## 2025-05-29 RX ADMIN — METOPROLOL SUCCINATE 25 MILLIGRAM(S): 50 TABLET, EXTENDED RELEASE ORAL at 21:31

## 2025-05-29 RX ADMIN — DOXAZOSIN MESYLATE 8 MILLIGRAM(S): 8 TABLET ORAL at 21:31

## 2025-05-29 RX ADMIN — DEXMEDETOMIDINE HYDROCHLORIDE IN SODIUM CHLORIDE 16.3 MICROGRAM(S)/KG/HR: 4 INJECTION INTRAVENOUS at 06:08

## 2025-05-29 RX ADMIN — IPRATROPIUM BROMIDE AND ALBUTEROL SULFATE 3 MILLILITER(S): .5; 2.5 SOLUTION RESPIRATORY (INHALATION) at 05:04

## 2025-05-29 RX ADMIN — DEXMEDETOMIDINE HYDROCHLORIDE IN SODIUM CHLORIDE 16.3 MICROGRAM(S)/KG/HR: 4 INJECTION INTRAVENOUS at 03:11

## 2025-05-29 RX ADMIN — QUETIAPINE FUMARATE 25 MILLIGRAM(S): 25 TABLET ORAL at 06:08

## 2025-05-29 RX ADMIN — BUDESONIDE 0.25 MILLIGRAM(S): 0.25 SUSPENSION RESPIRATORY (INHALATION) at 05:04

## 2025-05-29 RX ADMIN — ATORVASTATIN CALCIUM 80 MILLIGRAM(S): 80 TABLET, FILM COATED ORAL at 21:31

## 2025-05-29 RX ADMIN — LEVETIRACETAM 500 MILLIGRAM(S): 10 INJECTION, SOLUTION INTRAVENOUS at 06:08

## 2025-05-29 RX ADMIN — IPRATROPIUM BROMIDE AND ALBUTEROL SULFATE 3 MILLILITER(S): .5; 2.5 SOLUTION RESPIRATORY (INHALATION) at 23:06

## 2025-05-29 RX ADMIN — HEPARIN SODIUM 5000 UNIT(S): 1000 INJECTION INTRAVENOUS; SUBCUTANEOUS at 13:05

## 2025-05-29 RX ADMIN — DEXMEDETOMIDINE HYDROCHLORIDE IN SODIUM CHLORIDE 16.3 MICROGRAM(S)/KG/HR: 4 INJECTION INTRAVENOUS at 07:43

## 2025-05-29 RX ADMIN — Medication 500 MILLILITER(S): at 21:36

## 2025-05-29 RX ADMIN — POTASSIUM PHOSPHATE, MONOBASIC POTASSIUM PHOSPHATE, DIBASIC INJECTION, 83.33 MILLIMOLE(S): 236; 224 SOLUTION, CONCENTRATE INTRAVENOUS at 06:08

## 2025-05-29 RX ADMIN — BUDESONIDE 0.25 MILLIGRAM(S): 0.25 SUSPENSION RESPIRATORY (INHALATION) at 18:01

## 2025-05-29 RX ADMIN — POLYETHYLENE GLYCOL 3350 17 GRAM(S): 17 POWDER, FOR SOLUTION ORAL at 06:08

## 2025-05-29 RX ADMIN — Medication 1 APPLICATION(S): at 22:10

## 2025-05-29 RX ADMIN — Medication 1000 MILLIGRAM(S): at 22:25

## 2025-05-29 RX ADMIN — FINASTERIDE 5 MILLIGRAM(S): 1 TABLET, FILM COATED ORAL at 14:09

## 2025-05-29 RX ADMIN — POLYETHYLENE GLYCOL 3350 17 GRAM(S): 17 POWDER, FOR SOLUTION ORAL at 18:27

## 2025-05-29 RX ADMIN — SODIUM CHLORIDE 100 MILLILITER(S): 3 INJECTION, SOLUTION INTRAVENOUS at 01:19

## 2025-05-29 NOTE — CHART NOTE - NSCHARTNOTESELECT_GEN_ALL_CORE
EEG Prelim Report
Orthopaedics/Event Note
2 Physician Consent/Event Note
Event Note
TCD
Tertiary Trauma Exam
vte risk assessment/Event Note

## 2025-05-29 NOTE — PROGRESS NOTE ADULT - SUBJECTIVE AND OBJECTIVE BOX
Patient seen and examined at bedside.    --Anticoagulation--    T(C): 37.2 (05-28-25 @ 23:00), Max: 37.8 (05-28-25 @ 13:00)  HR: 74 (05-28-25 @ 23:55) (57 - 106)  BP: 145/67 (05-28-25 @ 23:00) (144/66 - 181/84)  RR: 13 (05-28-25 @ 23:00) (12 - 51)  SpO2: 98% (05-28-25 @ 23:55) (93% - 100%)  Wt(kg): --    Exam: no EO, pupils irregular but reactive, not FC, ERICKSON spont AG

## 2025-05-29 NOTE — CHART NOTE - NSCHARTNOTEFT_GEN_A_CORE
Transcranial doppler exam #1 (5/29/25) 1230pm  mean velocity  cm/sec                              Left         Right  HALLEY                    36          39  MCA                   50          50  PCA                      x            x  Technically difficult study.  Official report to follow.  Dustin

## 2025-05-29 NOTE — PROGRESS NOTE ADULT - SUBJECTIVE AND OBJECTIVE BOX
NSCU night fellow -- ADDITIONAL PROGRESS NOTE    Nighttime rounds were performed -- please refer to earlier Progress Note for HPI details.    ICU Vital Signs Last 24 Hrs  T(C): 37.6 (29 May 2025 15:00), Max: 37.6 (29 May 2025 15:00)  T(F): 99.7 (29 May 2025 15:00), Max: 99.7 (29 May 2025 15:00)  HR: 110 (29 May 2025 18:01) (61 - 125)  BP: 189/118 (29 May 2025 18:00) (144/66 - 203/91)  BP(mean): 146 (29 May 2025 18:00) (95 - 146)  ABP: --  ABP(mean): --  RR: 20 (29 May 2025 18:00) (12 - 27)  SpO2: 97% (29 May 2025 18:01) (93% - 100%)    O2 Parameters below as of 29 May 2025 18:01  Patient On (Oxygen Delivery Method): room air      Relevant labwork and imaging reviewed.    Seroquel 25 mg Q6 for nighttime delirium, EEG negative for seizures c/w 2 % at 100ccs/hour, BMP Q8
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
## 2025-05-29 NOTE — PROGRESS NOTE ADULT - SUBJECTIVE AND OBJECTIVE BOX
NSICU PROGRESS NOTE     Please see resident H&P for HPI.     Admission score: GCS 8     12h events   admitted to NSICU post TBI     -----------------------------------------------------------------------------------------------------  Exam:   General: Intubated, sedation on fentanyl and off versed    HEENT:    Neck: supple, trachea midline    Neuro:  Eyes closed,slight opening to verbal, mute, PEEARL, gaze midline  UE: no response to noxious stimuli  LE: no response to noxious stimuli  Tone supple all 4 extremities     CV: regular rate and rhythm    Pulm: clear to auscultation bilaterally     Abdomen; soft non tender, non distended, bowel sounds present in all quadrants    : not assessed    Skin: warm and dry, no lesions     MS: no muscle atrophy    -----------------------------------------------------------------------------------------------------    VITALS:   Vital Signs Last 24 Hrs  T(C): 37.4 (26 May 2025 12:53), Max: 37.4 (26 May 2025 12:53)  T(F): 99.4 (26 May 2025 12:53), Max: 99.4 (26 May 2025 12:53)  HR: 100 (26 May 2025 15:34) (92 - 100)  BP: 123/56 (26 May 2025 15:34) (123/56 - 180/86)  BP(mean): 80 (26 May 2025 15:34) (80 - 80)  RR: 18 (26 May 2025 15:34) (16 - 19)  SpO2: 98% (26 May 2025 15:34) (97% - 100%)    Parameters below as of 26 May 2025 15:34  Patient On (Oxygen Delivery Method): ventilator        Drips     Respiratory:  Mode: AC/ CMV (Assist Control/ Continuous Mandatory Ventilation)  RR (machine): 18  TV (machine): 450  FiO2: 40  PEEP: 6  ITime: 1  MAP: 10  PIP: 19        LABS:                        14.0   9.57  )-----------( 217      ( 26 May 2025 13:15 )             41.3     05-26    135  |  100  |  11  ----------------------------<  106[H]  3.7   |  19[L]  |  1.01      CAPILLARY BLOOD GLUCOSE      POCT Blood Glucose.: 116 mg/dL (26 May 2025 13:12)      I&O's Summary      Imaging   CXR     EKG     MEDICATION LEVELS:     IVF FLUIDS/MEDICATIONS:   MEDICATIONS  (STANDING):  chlorhexidine 0.12% Liquid 15 milliLiter(s) Oral Mucosa every 12 hours  fentaNYL   Infusion... 0.5 MICROgram(s)/kG/Hr (2.13 mL/Hr) IV Continuous <Continuous>  midazolam Infusion 0.02 mG/kG/Hr (1.8 mL/Hr) IV Continuous <Continuous>    MEDICATIONS  (PRN):     NSICU PROGRESS NOTE     Please see resident H&P for HPI.     Admission score: GCS 8     12h events   overnight LUE edema with bullae, soft with pulse likely infiltration    -----------------------------------------------------------------------------------------------------  Exam:   Neck: supple, trachea midline, c collar    Neuro:  Eyes closed, slight opens to verbal, mumbles words when son speaks to patient, PEEARL, gaze midline  RUE RLE 5/5   LUE LLE WDs  Tone supple all 4 extremities     CV: regular rate and rhythm    Pulm: clear to auscultation bilaterally     Abdomen; soft non tender, non distended, bowel sounds present in all quadrants    : not assessed    Skin: warm and dry, no lesions     MS: no muscle atrophy    -----------------------------------------------------------------------------------------------------    VITALS:   Vital Signs Last 24 Hrs  T(C): 37.1 (29 May 2025 07:00), Max: 37.8 (28 May 2025 13:00)  T(F): 98.8 (29 May 2025 07:00), Max: 100 (28 May 2025 13:00)  HR: 114 (29 May 2025 09:00) (61 - 114)  BP: 168/80 (29 May 2025 09:00) (144/66 - 181/84)  BP(mean): 115 (29 May 2025 09:00) (95 - 120)  RR: 18 (29 May 2025 09:00) (12 - 18)  SpO2: 98% (29 May 2025 09:00) (93% - 100%)    Parameters below as of 29 May 2025 07:00  Patient On (Oxygen Delivery Method): room air        Drips   precedex     Respiratory:        LABS:                        11.2   11.21 )-----------( 133      ( 28 May 2025 01:59 )             33.8     05-29    142  |  109[H]  |  12  ----------------------------<  160[H]  4.1   |  24  |  0.68      CAPILLARY BLOOD GLUCOSE          I&O's Summary    28 May 2025 07:01  -  29 May 2025 07:00  --------------------------------------------------------  IN: 4281.7 mL / OUT: 1200 mL / NET: 3081.7 mL    29 May 2025 07:01  -  29 May 2025 10:25  --------------------------------------------------------  IN: 389.6 mL / OUT: 0 mL / NET: 389.6 mL        Imaging   CXR     EKG     MEDICATION LEVELS:     IVF FLUIDS/MEDICATIONS:   MEDICATIONS  (STANDING):  albuterol/ipratropium for Nebulization 3 milliLiter(s) Nebulizer every 6 hours  atorvastatin 80 milliGRAM(s) Oral at bedtime  buDESOnide    Inhalation Suspension 0.25 milliGRAM(s) Inhalation every 12 hours  chlorhexidine 4% Liquid 1 Application(s) Topical <User Schedule>  doxazosin 8 milliGRAM(s) Oral at bedtime  heparin   Injectable 5000 Unit(s) SubCutaneous every 8 hours  levETIRAcetam 500 milliGRAM(s) Oral two times a day  polyethylene glycol 3350 17 Gram(s) Oral two times a day  senna 2 Tablet(s) Oral at bedtime  sodium chloride 2% + sodium acetate 50:50 1000 milliLiter(s) (100 mL/Hr) IV Continuous <Continuous>    MEDICATIONS  (PRN):

## 2025-05-29 NOTE — CHART NOTE - NSCHARTNOTEFT_GEN_A_CORE
5/29: US of the LUE was negative dvt. LUE xray negative for acute fx.  On exam, compartments soft and compressible. Strong radial pulse. No pain with passive stretch. The patient was not corry to follow commands, but was flexing/extending all fingers, wrist and elbow spontaneously. No concern for compartment syndrome at this time. Orthopaedic Surgery will continue to follow.

## 2025-05-29 NOTE — EEG REPORT - NS EEG TEXT BOX
REPORT OF CONTINUOUS VIDEO EEG    [facility]: [address] [facility] [address], [place] [address], Phone: [phone]  [facility]: [address] [place] [address], Phone: [phone]  [facility]: [address] [place] [address], Phone: [phone]    Patient Name: Eduin Tomlin   Age: 79 year, : 1945  Wang: [place] [address]  Referring Physician: -    Study Start: 2025	08:00      Study End:  2025	08:00.   Study Duration: 24 hr      -------------------------------------------------------------------------------------------------------  EEG Recording Technique:  The patient underwent continuous Video-EEG monitoring, using Telemetry System hardware on the XLTek Digital System. EEG and video data were stored on a computer hard drive with important events saved in digital archive files. The material was reviewed by a physician (electroencephalographer / epileptologist) on a daily basis. Kalen and seizure detection algorithms were utilized and reviewed. An EEG Technician attended to the patient, and was available throughout daytime work hours.  The epilepsy center neurologist was available in person or on call 24-hours per day.    EEG Placement and Labeling of Electrodes:  The EEG was performed utilizing 20 channel referential EEG connections (coronal over temporal over parasagittal montage) using all standard 10-20 electrode placements with EKG, with additional electrodes placed in the inferior temporal region using the modified 10-10 montage electrode placements for elective admissions, or if deemed necessary. Recording was at a sampling rate of 256 samples per second per channel. Time synchronized digital video recording was done simultaneously with EEG recording. A low light infrared camera was used for low light recording.     -------------------------------------------------------------------------------------------------------  History: -  79 year old Male with AMS is here for evaluation    Medication  KEPPRA IVPB      -------------------------------------------------------------------------------------------------------  Interpretation:    [[[Abbreviation Key:  PDR=alpha rhythm/posterior dominant rhythm. A-P=anterior posterior.  Amplitude: ‘very low’:<20; ‘low’:20-49; ‘medium’:; ‘high’:>150uV.  Persistence for periodic/rhythmic patterns (% of epoch) ‘rare’:<1%; ‘occasional’:1-10%; ‘frequent’:10-50%; ‘abundant’:50-90%; ‘continuous’:>90%.  Persistence for sporadic discharges: ‘rare’:<1/hr; ‘occasional’:1/min-1/hr; ‘frequent’:>1/min; ‘abundant’:>1/10 sec.  RPP=rhythmic and periodic patterns; GRDA=generalized rhythmic delta activity; FIRDA=frontal intermittent GRDA; LRDA=lateralized rhythmic delta activity; TIRDA=temporal intermittent rhythmic delta activity;  LPD=PLED=lateralized periodic discharges; GPD=generalized periodic discharges; BIPDs =bilateral independent periodic discharges; Mf=multifocal; SIRPDs=stimulus induced rhythmic, periodic, or ictal appearing discharges; BIRDs=brief potentially ictal rhythmic discharges >4 Hz, lasting .5-10s; PFA (paroxysmal bursts >13 Hz or =8 Hz <10s).  Modifiers: +F=with fast component; +S=with spike component; +R=with rhythmic component.  S-B=burst suppression pattern.  Max=maximal. N1-drowsy; N2-stage II sleep; N3-slow wave sleep. SSS/BETS=small sharp spikes/benign epileptiform transients of sleep. HV=hyperventilation; PS=photic stimulation]]]    FINDINGS:      Background:  SYMMETRY: symmetric  CONTINUOUS: continuous  PDR: absent  REACTIVITY: present  VOLTAGE: normal, [defined typically between 20-150uV]  AP GRADIENT: absent  BREACH: absent  OTHER: none    GENERALIZED SLOWING: present. Background is diffusely slow consisting of polymorphic delta > theta activity with superimposed alpha/beta frequency. The background is attenuated at times    FOCAL SLOWING: none was present.    State Changes:   Drowsiness and sleep was not seen      Sporadic Epileptiform Discharges:   None    Rhythmic and Periodic Patterns (RPPs):  None     Electrographic and Electroclinical seizures:  None    Other Clinical Events:  None    Activation Procedures:   Hyperventilation was not performed.    Photic stimulation was not performed.      Artifacts:  Intermittent myogenic and movement artifacts were noted.    ECG:  The heart rate on single channel ECG was predominantly between 70-80 BPM.  -------------------------------------------------------------------------------------------------------  EEG Classification:    Abnormal EEG in sedated and intubated state  1. Moderate to severe diffuse background slowing  2. Attenuation of the background	    -------------------------------------------------------------------------------------------------------  EEG Impression / Clinical Correlate:    Abnormal prolonged EEG study due to Moderate to severe diffuse cerebral dysfunction that is not specific in etiology    No epileptic discharges recorded.  No seizures recorded.      -------------------------------------------------------------------------------------------------------  ARIANNA Roe  Attending Physician, [facility]    ------------------------------------  EEG Reading Room: [phone]  On Call Service After Hours: [phone]

## 2025-05-29 NOTE — PROVIDER CONTACT NOTE (CHANGE IN STATUS NOTIFICATION) - ASSESSMENT
Patient's left arm is edematous and has several blisters in the tricep area and a few by his antecubital. Color is blotchy. Skin is tight. Pulse is +2. Skin is warm.

## 2025-05-29 NOTE — PROGRESS NOTE ADULT - ASSESSMENT
ASSESSMENT     PLAN     N  # TBI w/ non comminutive skull fractures   [place] to dc tomorrow   #Precedex to seroquel 25q6hs   #Pain: Tylenol prn  #Activity: [] OOB as tolerated [x] Bedrest [] PT [] OT [] PMNR    CV   #SBP >110  #TTE difficult exam    P   #Respiratory failure  extubated 05/28/25   PbtO2 goal >20mmHg on NC  secretions minimal   chest PT   no risk factors for laryngeal edema, trial SBT when off sedation       R   #Strict I+Os   keep 2%     GI   #Diet: tube feeding jevity    #Senna miralax   Last BM PTA   #splenic laceration     ID  afebrile    E  Goal euglycemia (-180)  #A1c 5.8     H  #DVT ppx:   SCDs  Chemoppx start tonight   LED negative     #CODE STATUS: FULL CODE     #DISPOSITION: ICU    #CRITICAL  ASSESSMENT     PLAN     N  # TBI w/ non comminutive skull fractures   Keppra for 7 days   remove c collar no fractures on CT neck   [place] removed 5/28  rCTH stable ICH   TCDs  EEG no seizure, slowing   #Off Precedex cont seroquel 25q6hs   #Pain: Tylenol iv prn   #Activity: [] OOB as tolerated [x] Bedrest [] PT [] OT [] PMNR    CV   #SBP >110  #TTE difficult exam    P   #Respiratory failure  extubated 05/28/25   secretions minimal   chest PT   no risk factors for laryngeal edema, trial SBT when off sedation     R   #Strict I+Os   keep 2%     GI   #Diet: tube feeding jevity    #Senna miralax   Last BM PTA   #splenic laceration     ID  afebrile    E  Goal euglycemia (-180)  #A1c 5.8     H  #DVT ppx:   SCDs  Chemoppx   LED negative     #LUE edema with bullae, soft with pulse likely infiltration- fu ultrasound and XR     #CODE STATUS: FULL CODE     #DISPOSITION: ICU    #CRITICAL

## 2025-05-30 LAB
ANION GAP SERPL CALC-SCNC: 11 MMOL/L — SIGNIFICANT CHANGE UP (ref 5–17)
APPEARANCE UR: CLEAR — SIGNIFICANT CHANGE UP
BACTERIA # UR AUTO: NEGATIVE /HPF — SIGNIFICANT CHANGE UP
BILIRUB UR-MCNC: NEGATIVE — SIGNIFICANT CHANGE UP
BUN SERPL-MCNC: 10 MG/DL — SIGNIFICANT CHANGE UP (ref 7–23)
CALCIUM SERPL-MCNC: 8.8 MG/DL — SIGNIFICANT CHANGE UP (ref 8.4–10.5)
CAST: 0 /LPF — SIGNIFICANT CHANGE UP (ref 0–4)
CHLORIDE SERPL-SCNC: 110 MMOL/L — HIGH (ref 96–108)
CO2 SERPL-SCNC: 22 MMOL/L — SIGNIFICANT CHANGE UP (ref 22–31)
COLOR SPEC: YELLOW — SIGNIFICANT CHANGE UP
CREAT SERPL-MCNC: 0.7 MG/DL — SIGNIFICANT CHANGE UP (ref 0.5–1.3)
DIFF PNL FLD: NEGATIVE — SIGNIFICANT CHANGE UP
EGFR: 94 ML/MIN/1.73M2 — SIGNIFICANT CHANGE UP
EGFR: 94 ML/MIN/1.73M2 — SIGNIFICANT CHANGE UP
GLUCOSE SERPL-MCNC: 117 MG/DL — HIGH (ref 70–99)
GLUCOSE UR QL: NEGATIVE MG/DL — SIGNIFICANT CHANGE UP
KETONES UR QL: NEGATIVE MG/DL — SIGNIFICANT CHANGE UP
LEUKOCYTE ESTERASE UR-ACNC: NEGATIVE — SIGNIFICANT CHANGE UP
NITRITE UR-MCNC: NEGATIVE — SIGNIFICANT CHANGE UP
PH UR: >=9 (ref 5–8)
POTASSIUM SERPL-MCNC: 3.7 MMOL/L — SIGNIFICANT CHANGE UP (ref 3.5–5.3)
POTASSIUM SERPL-SCNC: 3.7 MMOL/L — SIGNIFICANT CHANGE UP (ref 3.5–5.3)
PROT UR-MCNC: 30 MG/DL
RBC CASTS # UR COMP ASSIST: 5 /HPF — HIGH (ref 0–4)
REVIEW: SIGNIFICANT CHANGE UP
SODIUM SERPL-SCNC: 143 MMOL/L — SIGNIFICANT CHANGE UP (ref 135–145)
SP GR SPEC: 1.01 — SIGNIFICANT CHANGE UP (ref 1–1.03)
SQUAMOUS # UR AUTO: 0 /HPF — SIGNIFICANT CHANGE UP (ref 0–5)
UROBILINOGEN FLD QL: 1 MG/DL — SIGNIFICANT CHANGE UP (ref 0.2–1)
WBC UR QL: 1 /HPF — SIGNIFICANT CHANGE UP (ref 0–5)

## 2025-05-30 RX ORDER — QUETIAPINE FUMARATE 25 MG/1
12.5 TABLET ORAL THREE TIMES A DAY
Refills: 0 | Status: DISCONTINUED | OUTPATIENT
Start: 2025-05-30 | End: 2025-05-30

## 2025-05-30 RX ORDER — BISACODYL 5 MG
10 TABLET, DELAYED RELEASE (ENTERIC COATED) ORAL DAILY
Refills: 0 | Status: DISCONTINUED | OUTPATIENT
Start: 2025-05-30 | End: 2025-06-10

## 2025-05-30 RX ORDER — QUETIAPINE FUMARATE 25 MG/1
12.5 TABLET ORAL ONCE
Refills: 0 | Status: COMPLETED | OUTPATIENT
Start: 2025-05-30 | End: 2025-05-30

## 2025-05-30 RX ORDER — ACETAMINOPHEN 500 MG/5ML
1000 LIQUID (ML) ORAL ONCE
Refills: 0 | Status: COMPLETED | OUTPATIENT
Start: 2025-05-30 | End: 2025-05-30

## 2025-05-30 RX ADMIN — BUDESONIDE 0.25 MILLIGRAM(S): 0.25 SUSPENSION RESPIRATORY (INHALATION) at 05:23

## 2025-05-30 RX ADMIN — HEPARIN SODIUM 5000 UNIT(S): 1000 INJECTION INTRAVENOUS; SUBCUTANEOUS at 05:33

## 2025-05-30 RX ADMIN — QUETIAPINE FUMARATE 12.5 MILLIGRAM(S): 25 TABLET ORAL at 17:15

## 2025-05-30 RX ADMIN — Medication 1000 MILLIGRAM(S): at 19:22

## 2025-05-30 RX ADMIN — HEPARIN SODIUM 5000 UNIT(S): 1000 INJECTION INTRAVENOUS; SUBCUTANEOUS at 13:08

## 2025-05-30 RX ADMIN — IPRATROPIUM BROMIDE AND ALBUTEROL SULFATE 3 MILLILITER(S): .5; 2.5 SOLUTION RESPIRATORY (INHALATION) at 05:24

## 2025-05-30 RX ADMIN — SODIUM CHLORIDE 50 MILLILITER(S): 3 INJECTION, SOLUTION INTRAVENOUS at 07:26

## 2025-05-30 RX ADMIN — Medication 1 APPLICATION(S): at 22:55

## 2025-05-30 RX ADMIN — IPRATROPIUM BROMIDE AND ALBUTEROL SULFATE 3 MILLILITER(S): .5; 2.5 SOLUTION RESPIRATORY (INHALATION) at 17:18

## 2025-05-30 RX ADMIN — Medication 10 MILLIGRAM(S): at 22:50

## 2025-05-30 RX ADMIN — DOXAZOSIN MESYLATE 8 MILLIGRAM(S): 8 TABLET ORAL at 22:49

## 2025-05-30 RX ADMIN — ATORVASTATIN CALCIUM 80 MILLIGRAM(S): 80 TABLET, FILM COATED ORAL at 22:50

## 2025-05-30 RX ADMIN — METOPROLOL SUCCINATE 25 MILLIGRAM(S): 50 TABLET, EXTENDED RELEASE ORAL at 17:16

## 2025-05-30 RX ADMIN — HEPARIN SODIUM 5000 UNIT(S): 1000 INJECTION INTRAVENOUS; SUBCUTANEOUS at 22:50

## 2025-05-30 RX ADMIN — Medication 2 TABLET(S): at 22:50

## 2025-05-30 RX ADMIN — LEVETIRACETAM 500 MILLIGRAM(S): 10 INJECTION, SOLUTION INTRAVENOUS at 05:33

## 2025-05-30 RX ADMIN — POLYETHYLENE GLYCOL 3350 17 GRAM(S): 17 POWDER, FOR SOLUTION ORAL at 17:15

## 2025-05-30 RX ADMIN — SODIUM CHLORIDE 50 MILLILITER(S): 3 INJECTION, SOLUTION INTRAVENOUS at 19:10

## 2025-05-30 RX ADMIN — METOPROLOL SUCCINATE 25 MILLIGRAM(S): 50 TABLET, EXTENDED RELEASE ORAL at 05:33

## 2025-05-30 RX ADMIN — BUDESONIDE 0.25 MILLIGRAM(S): 0.25 SUSPENSION RESPIRATORY (INHALATION) at 17:40

## 2025-05-30 RX ADMIN — Medication 40 MILLIEQUIVALENT(S): at 22:49

## 2025-05-30 RX ADMIN — IPRATROPIUM BROMIDE AND ALBUTEROL SULFATE 3 MILLILITER(S): .5; 2.5 SOLUTION RESPIRATORY (INHALATION) at 12:36

## 2025-05-30 RX ADMIN — Medication 250 MILLIGRAM(S): at 22:49

## 2025-05-30 RX ADMIN — QUETIAPINE FUMARATE 12.5 MILLIGRAM(S): 25 TABLET ORAL at 13:08

## 2025-05-30 RX ADMIN — Medication 500 MILLILITER(S): at 15:43

## 2025-05-30 RX ADMIN — POLYETHYLENE GLYCOL 3350 17 GRAM(S): 17 POWDER, FOR SOLUTION ORAL at 05:33

## 2025-05-30 RX ADMIN — POTASSIUM PHOSPHATE, MONOBASIC POTASSIUM PHOSPHATE, DIBASIC INJECTION, 83.33 MILLIMOLE(S): 236; 224 SOLUTION, CONCENTRATE INTRAVENOUS at 02:04

## 2025-05-30 RX ADMIN — Medication 40 MILLIEQUIVALENT(S): at 09:16

## 2025-05-30 RX ADMIN — LEVETIRACETAM 500 MILLIGRAM(S): 10 INJECTION, SOLUTION INTRAVENOUS at 17:16

## 2025-05-30 RX ADMIN — Medication 40 MILLIEQUIVALENT(S): at 05:33

## 2025-05-30 RX ADMIN — Medication 40 MILLIEQUIVALENT(S): at 02:04

## 2025-05-30 RX ADMIN — FINASTERIDE 5 MILLIGRAM(S): 1 TABLET, FILM COATED ORAL at 11:19

## 2025-05-30 RX ADMIN — SODIUM CHLORIDE 50 MILLILITER(S): 3 INJECTION, SOLUTION INTRAVENOUS at 19:09

## 2025-05-30 RX ADMIN — Medication 400 MILLIGRAM(S): at 19:08

## 2025-05-30 NOTE — OCCUPATIONAL THERAPY INITIAL EVALUATION ADULT - GENERAL OBSERVATIONS, REHAB EVAL
Pt received semi supine in bed + C collar+ cardiac monitor + NG+ B/L wrsit restraint + posy vest+ condom cath.

## 2025-05-30 NOTE — PROGRESS NOTE ADULT - SUBJECTIVE AND OBJECTIVE BOX
Patient seen and examined at bedside.    --Anticoagulation--  heparin   Injectable 5000 Unit(s) SubCutaneous every 8 hours    T(C): 37.5 (05-29-25 @ 19:00), Max: 37.6 (05-29-25 @ 15:00)  HR: 100 (05-29-25 @ 23:18) (68 - 128)  BP: 176/82 (05-29-25 @ 22:00) (150/73 - 203/91)  RR: 22 (05-29-25 @ 22:00) (12 - 27)  SpO2: 99% (05-29-25 @ 23:18) (96% - 100%)  Wt(kg): --    Exam: Sleepy, no EO, pupils irregular but reactive, not FC, ERICKSON spont AG R>L

## 2025-05-30 NOTE — PROGRESS NOTE ADULT - SUBJECTIVE AND OBJECTIVE BOX
NSCU ATTENDING -- ADDITIONAL PROGRESS NOTE    Nighttime rounds were performed -- please refer to earlier Progress Note for HPI details.    T(C): 37.8 (05-30-25 @ 19:00), Max: 38.4 (05-30-25 @ 18:45)  HR: 112 (05-30-25 @ 19:00) (100 - 118)  BP: 180/93 (05-30-25 @ 19:00) (148/75 - 181/79)  RR: 25 (05-30-25 @ 19:00) (16 - 25)  SpO2: 98% (05-30-25 @ 19:00) (97% - 100%)  Wt(kg): --    Relevant labwork and imaging reviewed.    CBC:            11.8   10.11 )-----------( 166      ( 05-29-25 @ 21:44 )             34.3         Chem:         ( 05-30-25 @ 15:19 )    143  |  110[H]  |  10  ----------------------------<  117[H]  3.7   |  22  |  0.70      MEDICATIONS  (STANDING):  albuterol/ipratropium for Nebulization 3 milliLiter(s) Nebulizer every 6 hours  atorvastatin 80 milliGRAM(s) Oral at bedtime  buDESOnide    Inhalation Suspension 0.25 milliGRAM(s) Inhalation every 12 hours  chlorhexidine 4% Liquid 1 Application(s) Topical <User Schedule>  doxazosin 8 milliGRAM(s) Oral at bedtime  finasteride 5 milliGRAM(s) Oral daily  heparin   Injectable 5000 Unit(s) SubCutaneous every 8 hours  levETIRAcetam 500 milliGRAM(s) Oral two times a day  metoprolol tartrate 25 milliGRAM(s) Oral two times a day  polyethylene glycol 3350 17 Gram(s) Oral two times a day  potassium chloride   Solution 40 milliEquivalent(s) Oral once  senna 2 Tablet(s) Oral at bedtime  sodium chloride 2% + sodium acetate 50:50 1000 milliLiter(s) (50 mL/Hr) IV Continuous <Continuous>  sodium chloride 2% + sodium acetate 50:50 1000 milliLiter(s) (50 mL/Hr) IV Continuous <Continuous>    MEDICATIONS  (PRN):  acetaminophen     Tablet .. 650 milliGRAM(s) Oral every 6 hours PRN Mild Pain (1 - 3)    Neuro: EO spont, anisocoric, reactive b/l, R>L spont/AG, no FC even with family at bedside   Mandarin speaking    [A/P]  Q4 checks  keppra -->to VPA for 3 more days to finish 7d ppx   vEEG negative x 2d  CT C spine repeat per neurosurgery may need fentanyl 1x for imaging  LBM 5/26, add dulcolax supp, cont senna/miralax bid  cont tube feeding  -180, cont metropolol 25mg Q12 home dose   Na goal normonatremia, 2% decrease to 50cc/hr   SQH ppx  f/u cultures, monitor off antibiotics    stable for transfer to floor

## 2025-05-30 NOTE — OCCUPATIONAL THERAPY INITIAL EVALUATION ADULT - ADDITIONAL COMMENTS
Unable to provide PLOF at this time, pt confused and non verbal during time of eval. Per RN who spoke with family; pt was independent prior.
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
## 2025-05-30 NOTE — PROGRESS NOTE ADULT - SUBJECTIVE AND OBJECTIVE BOX
SUBJECTIVE:       Pt seen and examined at bedside. No acute events overnight. Reports pain alleviated with medication. No CP, SOB, N/V, F/C, new N/T.    Vital Signs (24 Hrs):  T(C): 37 (05-30-25 @ 03:00), Max: 37.6 (05-29-25 @ 15:00)  HR: 107 (05-30-25 @ 05:37) (94 - 128)  BP: 176/81 (05-30-25 @ 05:00) (148/75 - 203/91)  RR: 17 (05-30-25 @ 05:00) (14 - 27)  SpO2: 99% (05-30-25 @ 05:37) (96% - 100%)  Wt(kg): --    LABS:                          11.8   10.11 )-----------( 166      ( 29 May 2025 21:44 )             34.3     05-29    142  |  106  |  9   ----------------------------<  173[H]  3.2[L]   |  23  |  0.70    Ca    8.6      29 May 2025 21:44  Phos  2.0     05-29  Mg     2.1     05-29      PHYSICAL EXAM    General: NAD, awake and alert, pleasant    LUE:  No open skin, +swelling and serous blistering of arm particularly at the elbow, swelling of olecranon bursa  Full passive ROM, no obvious crepitus  Unable to assess ttp  Compartments soft and compressible  Unable to assess sensorimotor exam  2+ brachial and radial pulses, cap refill <2s throughout fingers      Assessment/Plan:  79y Male with LUE swelling and blistering    -Low to no concern for compartment syndrome at this time given exam  -XR L shoulder/humerus/elbow/forearm/wrist negative for fx  -US/doppler LUE negative for DVT/collection  -WBAT LUE  -Pain control as needed  -Ice and elevate as tolerated  -DVT ppx: per primary  -Rest of management per primary  -No acute orthopaedic surgery indicated at this time - please reconsult as needed  -Ortho stable for DC  -D/w Dr. Chang, will advise if plan changes

## 2025-05-30 NOTE — SPEECH LANGUAGE PATHOLOGY EVALUATION - SLP GENERAL OBSERVATIONS
Pt received breathing comfortably on room air, obtunded, +NGT +posey vest +b/l mittens and b/l wrist restraints. Session conducted in Mandarin with this Mandarin-English speaking clinician. Aox0, deeply sleeping, did not respond to clinician Pt received breathing comfortably on room air, obtunded, +NGT +posey vest +C-collar +b/l mittens and b/l wrist restraints. Session conducted in Mandarin with this Mandarin-English speaking clinician. Aox0, deeply sleeping, did not respond to clinician

## 2025-05-30 NOTE — EEG REPORT - NS EEG TEXT BOX
REPORT OF CONTINUOUS VIDEO EEG    [facility]: [address] [facility] [address], [place] [address], Phone: [phone]  [facility]: [address] [place] [address], Phone: [phone]  [facility]: [address] [place] [address], Phone: [phone]    Patient Name: Eduin Tomlin   Age: 79 year, : 1945  Wang: [place] [address]  Referring Physician: -    Study Start: 2025	08:00      Study End:  2025	20:46  Study Duration: 12:46hr      -------------------------------------------------------------------------------------------------------  EEG Recording Technique:  The patient underwent continuous Video-EEG monitoring, using Telemetry System hardware on the XLTek Digital System. EEG and video data were stored on a computer hard drive with important events saved in digital archive files. The material was reviewed by a physician (electroencephalographer / epileptologist) on a daily basis. Kalen and seizure detection algorithms were utilized and reviewed. An EEG Technician attended to the patient, and was available throughout daytime work hours.  The epilepsy center neurologist was available in person or on call 24-hours per day.    EEG Placement and Labeling of Electrodes:  The EEG was performed utilizing 20 channel referential EEG connections (coronal over temporal over parasagittal montage) using all standard 10-20 electrode placements with EKG, with additional electrodes placed in the inferior temporal region using the modified 10-10 montage electrode placements for elective admissions, or if deemed necessary. Recording was at a sampling rate of 256 samples per second per channel. Time synchronized digital video recording was done simultaneously with EEG recording. A low light infrared camera was used for low light recording.     -------------------------------------------------------------------------------------------------------  History: -  79 year old Male with TBI    Medication  KEPPRA IVPB      -------------------------------------------------------------------------------------------------------  Interpretation:    [[[Abbreviation Key:  PDR=alpha rhythm/posterior dominant rhythm. A-P=anterior posterior.  Amplitude: ‘very low’:<20; ‘low’:20-49; ‘medium’:; ‘high’:>150uV.  Persistence for periodic/rhythmic patterns (% of epoch) ‘rare’:<1%; ‘occasional’:1-10%; ‘frequent’:10-50%; ‘abundant’:50-90%; ‘continuous’:>90%.  Persistence for sporadic discharges: ‘rare’:<1/hr; ‘occasional’:1/min-1/hr; ‘frequent’:>1/min; ‘abundant’:>1/10 sec.  RPP=rhythmic and periodic patterns; GRDA=generalized rhythmic delta activity; FIRDA=frontal intermittent GRDA; LRDA=lateralized rhythmic delta activity; TIRDA=temporal intermittent rhythmic delta activity;  LPD=PLED=lateralized periodic discharges; GPD=generalized periodic discharges; BIPDs =bilateral independent periodic discharges; Mf=multifocal; SIRPDs=stimulus induced rhythmic, periodic, or ictal appearing discharges; BIRDs=brief potentially ictal rhythmic discharges >4 Hz, lasting .5-10s; PFA (paroxysmal bursts >13 Hz or =8 Hz <10s).  Modifiers: +F=with fast component; +S=with spike component; +R=with rhythmic component.  S-B=burst suppression pattern.  Max=maximal. N1-drowsy; N2-stage II sleep; N3-slow wave sleep. SSS/BETS=small sharp spikes/benign epileptiform transients of sleep. HV=hyperventilation; PS=photic stimulation]]]    FINDINGS:      Background:  SYMMETRY: symmetric  CONTINUOUS: continuous  PDR: rudimentary 9hz 20uV PDR in max wakefulness better seen over the right  REACTIVITY: present  VOLTAGE: normal, [defined typically between 20-150uV]  AP GRADIENT: absent  BREACH: absent  OTHER: none    GENERALIZED SLOWING:  polymorphic delta > theta activity with superimposed alpha/beta frequency.     FOCAL SLOWING: none was present.    State Changes:   Drowsiness and sleep was not seen      Sporadic Epileptiform Discharges:   None    Rhythmic and Periodic Patterns (RPPs):  None     Electrographic and Electroclinical seizures:  None    Other Clinical Events:  None    Activation Procedures:   Hyperventilation was not performed.    Photic stimulation was not performed.    Artifacts:  Intermittent myogenic and movement artifacts were noted.  More progressively limited after 17:00 with electrode displacements    ECG:  The heart rate on single channel ECG was predominantly between 70-80 BPM.  -------------------------------------------------------------------------------------------------------  EEG Classification:    Abnormal EEG in sedated and intubated state  1. Moderate multifocal slowinging  2. Attenuation of the background	    -------------------------------------------------------------------------------------------------------  EEG Impression / Clinical Correlate:    Abnormal prolonged EEG study due to moderate multifocal cerebral dysfunction  More progressively limited after 17:00 with electrode displacements  No epileptic discharges recorded.  No seizures recorded.    MD MILI Ross  Director, Continuous EEG Monitoring Program, [facility]   and Epilepsy Fellowship ,   Department of Neurology, [facility]    [facility] EEG Reading Room Ph#: [phone]  Epilepsy Answering Service after 5PM and before 8:30AM: Ph#: [phone]
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
## 2025-05-30 NOTE — PROGRESS NOTE ADULT - ASSESSMENT
ASSESSMENT     PLAN     N  # TBI w/ non comminutive skull fractures   Keppra for 7 days   remove c collar no fractures on CT neck   [place] removed 5/28  rCTH stable ICH   TCDs  EEG no seizure, slowing   #Off Precedex cont seroquel 25q6hs   #Pain: Tylenol iv prn   #Activity: [] OOB as tolerated [x] Bedrest [] PT [] OT [] PMNR    CV   #SBP >110  #TTE difficult exam    P   #Respiratory failure  extubated 05/28/25   secretions minimal   chest PT   no risk factors for laryngeal edema, trial SBT when off sedation     R   #Strict I+Os   keep 2%     GI   #Diet: tube feeding jevity    #Senna miralax   Last BM PTA   #splenic laceration     ID  afebrile    E  Goal euglycemia (-180)  #A1c 5.8     H  #DVT ppx:   SCDs  Chemoppx   LED negative     #LUE edema with bullae, soft with pulse likely infiltration- fu ultrasound and XR     #CODE STATUS: FULL CODE     #DISPOSITION: ICU    #CRITICAL

## 2025-05-30 NOTE — PROGRESS NOTE ADULT - SUBJECTIVE AND OBJECTIVE BOX
NSICU PROGRESS NOTE     Please see resident H&P for HPI.     Admission score: GCS 8     12h events       -----------------------------------------------------------------------------------------------------  Exam:   Neck: supple, trachea midline, c collar    Neuro:  Eyes closed, slight opens to verbal, mumbles words when son speaks to patient, PEEARL, gaze midline  RUE RLE 5/5   LUE LLE WDs  Tone supple all 4 extremities     CV: regular rate and rhythm    Pulm: clear to auscultation bilaterally     Abdomen; soft non tender, non distended, bowel sounds present in all quadrants    : not assessed    Skin: warm and dry, no lesions     MS: no muscle atrophy    -----------------------------------------------------------------------------------------------------    VITALS:   Vital Signs Last 24 Hrs  T(C): 37.1 (29 May 2025 07:00), Max: 37.8 (28 May 2025 13:00)  T(F): 98.8 (29 May 2025 07:00), Max: 100 (28 May 2025 13:00)  HR: 114 (29 May 2025 09:00) (61 - 114)  BP: 168/80 (29 May 2025 09:00) (144/66 - 181/84)  BP(mean): 115 (29 May 2025 09:00) (95 - 120)  RR: 18 (29 May 2025 09:00) (12 - 18)  SpO2: 98% (29 May 2025 09:00) (93% - 100%)    Parameters below as of 29 May 2025 07:00  Patient On (Oxygen Delivery Method): room air        Drips   precedex     Respiratory:        LABS:                        11.2   11.21 )-----------( 133      ( 28 May 2025 01:59 )             33.8     05-29    142  |  109[H]  |  12  ----------------------------<  160[H]  4.1   |  24  |  0.68      CAPILLARY BLOOD GLUCOSE          I&O's Summary    28 May 2025 07:01  -  29 May 2025 07:00  --------------------------------------------------------  IN: 4281.7 mL / OUT: 1200 mL / NET: 3081.7 mL    29 May 2025 07:01  -  29 May 2025 10:25  --------------------------------------------------------  IN: 389.6 mL / OUT: 0 mL / NET: 389.6 mL        Imaging   CXR     EKG     MEDICATION LEVELS:     IVF FLUIDS/MEDICATIONS:   MEDICATIONS  (STANDING):  albuterol/ipratropium for Nebulization 3 milliLiter(s) Nebulizer every 6 hours  atorvastatin 80 milliGRAM(s) Oral at bedtime  buDESOnide    Inhalation Suspension 0.25 milliGRAM(s) Inhalation every 12 hours  chlorhexidine 4% Liquid 1 Application(s) Topical <User Schedule>  doxazosin 8 milliGRAM(s) Oral at bedtime  heparin   Injectable 5000 Unit(s) SubCutaneous every 8 hours  levETIRAcetam 500 milliGRAM(s) Oral two times a day  polyethylene glycol 3350 17 Gram(s) Oral two times a day  senna 2 Tablet(s) Oral at bedtime  sodium chloride 2% + sodium acetate 50:50 1000 milliLiter(s) (100 mL/Hr) IV Continuous <Continuous>    MEDICATIONS  (PRN):
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
## 2025-05-30 NOTE — SPEECH LANGUAGE PATHOLOGY EVALUATION - COMMENTS
No dysphagia screen noted in chart.    5/29 CTH FINDINGS:  VENTRICLES AND SULCI: No hydrocephalus. Redemonstrated layering hemorrhage in the left occipital horn. INTRA-AXIAL: Grossly unchanged multifocal intraparenchymal hemorrhages, most pronounced involving the left frontal and temporal lobes. Index hemorrhage left frontal lobe measures approximately 3.6 x 3.1 cm, unchanged since prior study (1:25). No midline shift.  EXTRA-AXIAL: Stable right frontotemporal and left frontoparietal subdural hematomas. Unchanged small amount of hemorrhage involving the anterior falx and biparietal subarachnoid hemorrhage. Basal cisterns are normal in appearance.  CXR 5/26 No focal consolidation.    No prior SLP exams in [facility] or MBS in PACS W/ disorder of consciousness N/A Absent directive following to open eyes, mouth, move any limbs with verbal and light tactile cueing d/w MELISSA Rouse; ACP Vicky and NSCU team Nonverbal during this encounter No non-verbal expression or movement

## 2025-05-31 LAB
ANION GAP SERPL CALC-SCNC: 12 MMOL/L — SIGNIFICANT CHANGE UP (ref 5–17)
ANION GAP SERPL CALC-SCNC: 14 MMOL/L — SIGNIFICANT CHANGE UP (ref 5–17)
BUN SERPL-MCNC: 14 MG/DL — SIGNIFICANT CHANGE UP (ref 7–23)
BUN SERPL-MCNC: 15 MG/DL — SIGNIFICANT CHANGE UP (ref 7–23)
CALCIUM SERPL-MCNC: 8.6 MG/DL — SIGNIFICANT CHANGE UP (ref 8.4–10.5)
CALCIUM SERPL-MCNC: 8.7 MG/DL — SIGNIFICANT CHANGE UP (ref 8.4–10.5)
CHLORIDE SERPL-SCNC: 109 MMOL/L — HIGH (ref 96–108)
CHLORIDE SERPL-SCNC: 110 MMOL/L — HIGH (ref 96–108)
CO2 SERPL-SCNC: 18 MMOL/L — LOW (ref 22–31)
CO2 SERPL-SCNC: 21 MMOL/L — LOW (ref 22–31)
CREAT SERPL-MCNC: 0.73 MG/DL — SIGNIFICANT CHANGE UP (ref 0.5–1.3)
CREAT SERPL-MCNC: 0.75 MG/DL — SIGNIFICANT CHANGE UP (ref 0.5–1.3)
EGFR: 92 ML/MIN/1.73M2 — SIGNIFICANT CHANGE UP
EGFR: 92 ML/MIN/1.73M2 — SIGNIFICANT CHANGE UP
EGFR: 93 ML/MIN/1.73M2 — SIGNIFICANT CHANGE UP
EGFR: 93 ML/MIN/1.73M2 — SIGNIFICANT CHANGE UP
GLUCOSE SERPL-MCNC: 148 MG/DL — HIGH (ref 70–99)
GLUCOSE SERPL-MCNC: 157 MG/DL — HIGH (ref 70–99)
POTASSIUM SERPL-MCNC: 4.2 MMOL/L — SIGNIFICANT CHANGE UP (ref 3.5–5.3)
POTASSIUM SERPL-MCNC: 4.2 MMOL/L — SIGNIFICANT CHANGE UP (ref 3.5–5.3)
POTASSIUM SERPL-SCNC: 4.2 MMOL/L — SIGNIFICANT CHANGE UP (ref 3.5–5.3)
POTASSIUM SERPL-SCNC: 4.2 MMOL/L — SIGNIFICANT CHANGE UP (ref 3.5–5.3)
SODIUM SERPL-SCNC: 142 MMOL/L — SIGNIFICANT CHANGE UP (ref 135–145)
SODIUM SERPL-SCNC: 142 MMOL/L — SIGNIFICANT CHANGE UP (ref 135–145)

## 2025-05-31 RX ORDER — TRAMADOL HYDROCHLORIDE 50 MG/1
25 TABLET, FILM COATED ORAL EVERY 6 HOURS
Refills: 0 | Status: DISCONTINUED | OUTPATIENT
Start: 2025-05-31 | End: 2025-06-05

## 2025-05-31 RX ORDER — ACETAMINOPHEN 500 MG/5ML
1000 LIQUID (ML) ORAL ONCE
Refills: 0 | Status: COMPLETED | OUTPATIENT
Start: 2025-05-31 | End: 2025-05-31

## 2025-05-31 RX ORDER — SODIUM CHLORIDE 9 G/1000ML
500 INJECTION, SOLUTION INTRAVENOUS ONCE
Refills: 0 | Status: COMPLETED | OUTPATIENT
Start: 2025-05-31 | End: 2025-05-31

## 2025-05-31 RX ORDER — ACETAMINOPHEN 500 MG/5ML
650 LIQUID (ML) ORAL EVERY 6 HOURS
Refills: 0 | Status: DISCONTINUED | OUTPATIENT
Start: 2025-05-31 | End: 2025-06-10

## 2025-05-31 RX ADMIN — SODIUM CHLORIDE 50 MILLILITER(S): 3 INJECTION, SOLUTION INTRAVENOUS at 08:32

## 2025-05-31 RX ADMIN — HEPARIN SODIUM 5000 UNIT(S): 1000 INJECTION INTRAVENOUS; SUBCUTANEOUS at 05:33

## 2025-05-31 RX ADMIN — HEPARIN SODIUM 5000 UNIT(S): 1000 INJECTION INTRAVENOUS; SUBCUTANEOUS at 21:07

## 2025-05-31 RX ADMIN — Medication 650 MILLIGRAM(S): at 16:54

## 2025-05-31 RX ADMIN — Medication 2 TABLET(S): at 21:17

## 2025-05-31 RX ADMIN — Medication 650 MILLIGRAM(S): at 08:32

## 2025-05-31 RX ADMIN — ATORVASTATIN CALCIUM 80 MILLIGRAM(S): 80 TABLET, FILM COATED ORAL at 21:07

## 2025-05-31 RX ADMIN — TRAMADOL HYDROCHLORIDE 25 MILLIGRAM(S): 50 TABLET, FILM COATED ORAL at 18:15

## 2025-05-31 RX ADMIN — Medication 1000 MILLIGRAM(S): at 03:55

## 2025-05-31 RX ADMIN — Medication 250 MILLIGRAM(S): at 05:33

## 2025-05-31 RX ADMIN — Medication 650 MILLIGRAM(S): at 15:54

## 2025-05-31 RX ADMIN — IPRATROPIUM BROMIDE AND ALBUTEROL SULFATE 3 MILLILITER(S): .5; 2.5 SOLUTION RESPIRATORY (INHALATION) at 05:36

## 2025-05-31 RX ADMIN — POLYETHYLENE GLYCOL 3350 17 GRAM(S): 17 POWDER, FOR SOLUTION ORAL at 05:34

## 2025-05-31 RX ADMIN — METOPROLOL SUCCINATE 25 MILLIGRAM(S): 50 TABLET, EXTENDED RELEASE ORAL at 05:34

## 2025-05-31 RX ADMIN — FINASTERIDE 5 MILLIGRAM(S): 1 TABLET, FILM COATED ORAL at 12:20

## 2025-05-31 RX ADMIN — DOXAZOSIN MESYLATE 8 MILLIGRAM(S): 8 TABLET ORAL at 21:07

## 2025-05-31 RX ADMIN — Medication 400 MILLIGRAM(S): at 03:40

## 2025-05-31 RX ADMIN — SODIUM CHLORIDE 500 MILLILITER(S): 9 INJECTION, SOLUTION INTRAVENOUS at 03:57

## 2025-05-31 RX ADMIN — METOPROLOL SUCCINATE 25 MILLIGRAM(S): 50 TABLET, EXTENDED RELEASE ORAL at 17:35

## 2025-05-31 RX ADMIN — Medication 1 APPLICATION(S): at 21:12

## 2025-05-31 RX ADMIN — TRAMADOL HYDROCHLORIDE 25 MILLIGRAM(S): 50 TABLET, FILM COATED ORAL at 13:20

## 2025-05-31 RX ADMIN — Medication 250 MILLIGRAM(S): at 15:53

## 2025-05-31 RX ADMIN — TRAMADOL HYDROCHLORIDE 25 MILLIGRAM(S): 50 TABLET, FILM COATED ORAL at 12:20

## 2025-05-31 RX ADMIN — HEPARIN SODIUM 5000 UNIT(S): 1000 INJECTION INTRAVENOUS; SUBCUTANEOUS at 15:54

## 2025-05-31 RX ADMIN — Medication 650 MILLIGRAM(S): at 09:32

## 2025-05-31 RX ADMIN — BUDESONIDE 0.25 MILLIGRAM(S): 0.25 SUSPENSION RESPIRATORY (INHALATION) at 05:38

## 2025-05-31 RX ADMIN — TRAMADOL HYDROCHLORIDE 25 MILLIGRAM(S): 50 TABLET, FILM COATED ORAL at 17:35

## 2025-05-31 RX ADMIN — IPRATROPIUM BROMIDE AND ALBUTEROL SULFATE 3 MILLILITER(S): .5; 2.5 SOLUTION RESPIRATORY (INHALATION) at 12:04

## 2025-05-31 RX ADMIN — Medication 250 MILLIGRAM(S): at 21:07

## 2025-05-31 NOTE — PROGRESS NOTE ADULT - SUBJECTIVE AND OBJECTIVE BOX
Patient seen and examined at bedside.    --Anticoagulation--  heparin   Injectable 5000 Unit(s) SubCutaneous every 8 hours    T(C): 37.4 (05-30-25 @ 23:00), Max: 38.4 (05-30-25 @ 18:45)  HR: 119 (05-31-25 @ 01:00) (104 - 120)  BP: 153/68 (05-31-25 @ 01:00) (153/68 - 181/79)  RR: 17 (05-31-25 @ 01:00) (16 - 25)  SpO2: 97% (05-31-25 @ 01:00) (97% - 100%)  Wt(kg): --    Exam: Sleepy, EOS, pupils irregular but reactive, not FC, ERICKSON spont AG R>L

## 2025-05-31 NOTE — PROGRESS NOTE ADULT - ASSESSMENT
Bedboard to 4 jaime w/obs     Rpt CT C spine to clear C collar per JU pending        MR Abdomen w/ contrast when able (splenic lesion)     BMP q12hrs

## 2025-05-31 NOTE — PROGRESS NOTE ADULT - SUBJECTIVE AND OBJECTIVE BOX
Patient seen and examined at bedside.    --Anticoagulation--  heparin   Injectable 5000 Unit(s) SubCutaneous every 8 hours    T(C): 37 (05-31-25 @ 19:00), Max: 37.8 (05-31-25 @ 15:00)  HR: 86 (05-31-25 @ 19:00) (86 - 121)  BP: 156/70 (05-31-25 @ 19:00) (138/74 - 176/89)  RR: 15 (05-31-25 @ 19:00) (15 - 20)  SpO2: 99% (05-31-25 @ 19:00) (96% - 100%)  Wt(kg): --    Exam:  Sleepy, EOS, pupils irregular but reactive, not FC, ERICKSON spont AG R>L
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
## 2025-05-31 NOTE — PROGRESS NOTE ADULT - ASSESSMENT
Rpt CT C spine to clear C collar      MR Abdomen w/ contrast when able (splenic lesion)     BMP q12hrs

## 2025-05-31 NOTE — PROGRESS NOTE ADULT - SUBJECTIVE AND OBJECTIVE BOX
NSCU ATTENDING -- ADDITIONAL PROGRESS NOTE    Nighttime rounds were performed -- please refer to earlier Progress Note for HPI details.    T(C): 37 (05-31-25 @ 19:00), Max: 37.8 (05-31-25 @ 15:00)  HR: 86 (05-31-25 @ 19:00) (86 - 121)  BP: 156/70 (05-31-25 @ 19:00) (138/74 - 176/89)  RR: 15 (05-31-25 @ 19:00) (15 - 20)  SpO2: 99% (05-31-25 @ 19:00) (96% - 100%)  Wt(kg): --    Relevant labwork and imaging reviewed.      [A/P] Q4 checks  keppra -->to VPA for 3 more days to finish 7d ppx   off vEEG negative x2d  CT C spine repeat per neurosurgery may need fentanyl 1x for imaging  LBM 5/31, cont senna/miralax bid  cont tube feeding  SLP   -180, cont metropolol 25mg Q12 home dose   Na goal normonatremia, IVL now  SQH ppx  f/u cultures, monitor off antibiotics    stable for transfer to floor

## 2025-06-01 LAB
-  COAGULASE NEGATIVE STAPHYLOCOCCUS: SIGNIFICANT CHANGE UP
ANION GAP SERPL CALC-SCNC: 12 MMOL/L — SIGNIFICANT CHANGE UP (ref 5–17)
BUN SERPL-MCNC: 17 MG/DL — SIGNIFICANT CHANGE UP (ref 7–23)
CALCIUM SERPL-MCNC: 8.7 MG/DL — SIGNIFICANT CHANGE UP (ref 8.4–10.5)
CHLORIDE SERPL-SCNC: 107 MMOL/L — SIGNIFICANT CHANGE UP (ref 96–108)
CO2 SERPL-SCNC: 21 MMOL/L — LOW (ref 22–31)
CREAT SERPL-MCNC: 0.77 MG/DL — SIGNIFICANT CHANGE UP (ref 0.5–1.3)
CULTURE RESULTS: SIGNIFICANT CHANGE UP
CULTURE RESULTS: SIGNIFICANT CHANGE UP
EGFR: 91 ML/MIN/1.73M2 — SIGNIFICANT CHANGE UP
EGFR: 91 ML/MIN/1.73M2 — SIGNIFICANT CHANGE UP
GLUCOSE SERPL-MCNC: 126 MG/DL — HIGH (ref 70–99)
GRAM STN FLD: ABNORMAL
MAGNESIUM SERPL-MCNC: 2.3 MG/DL — SIGNIFICANT CHANGE UP (ref 1.6–2.6)
METHOD TYPE: SIGNIFICANT CHANGE UP
PHOSPHATE SERPL-MCNC: 3.6 MG/DL — SIGNIFICANT CHANGE UP (ref 2.5–4.5)
POTASSIUM SERPL-MCNC: 4.2 MMOL/L — SIGNIFICANT CHANGE UP (ref 3.5–5.3)
POTASSIUM SERPL-SCNC: 4.2 MMOL/L — SIGNIFICANT CHANGE UP (ref 3.5–5.3)
SODIUM SERPL-SCNC: 140 MMOL/L — SIGNIFICANT CHANGE UP (ref 135–145)
SPECIMEN SOURCE: SIGNIFICANT CHANGE UP
SPECIMEN SOURCE: SIGNIFICANT CHANGE UP

## 2025-06-01 RX ADMIN — TRAMADOL HYDROCHLORIDE 25 MILLIGRAM(S): 50 TABLET, FILM COATED ORAL at 02:20

## 2025-06-01 RX ADMIN — METOPROLOL SUCCINATE 25 MILLIGRAM(S): 50 TABLET, EXTENDED RELEASE ORAL at 17:34

## 2025-06-01 RX ADMIN — POLYETHYLENE GLYCOL 3350 17 GRAM(S): 17 POWDER, FOR SOLUTION ORAL at 17:34

## 2025-06-01 RX ADMIN — HEPARIN SODIUM 5000 UNIT(S): 1000 INJECTION INTRAVENOUS; SUBCUTANEOUS at 05:11

## 2025-06-01 RX ADMIN — POLYETHYLENE GLYCOL 3350 17 GRAM(S): 17 POWDER, FOR SOLUTION ORAL at 05:11

## 2025-06-01 RX ADMIN — Medication 250 MILLIGRAM(S): at 15:01

## 2025-06-01 RX ADMIN — Medication 250 MILLIGRAM(S): at 21:17

## 2025-06-01 RX ADMIN — HEPARIN SODIUM 5000 UNIT(S): 1000 INJECTION INTRAVENOUS; SUBCUTANEOUS at 15:00

## 2025-06-01 RX ADMIN — FINASTERIDE 5 MILLIGRAM(S): 1 TABLET, FILM COATED ORAL at 15:01

## 2025-06-01 RX ADMIN — Medication 2 TABLET(S): at 21:17

## 2025-06-01 RX ADMIN — METOPROLOL SUCCINATE 25 MILLIGRAM(S): 50 TABLET, EXTENDED RELEASE ORAL at 05:11

## 2025-06-01 RX ADMIN — Medication 650 MILLIGRAM(S): at 21:47

## 2025-06-01 RX ADMIN — HEPARIN SODIUM 5000 UNIT(S): 1000 INJECTION INTRAVENOUS; SUBCUTANEOUS at 21:18

## 2025-06-01 RX ADMIN — Medication 1 APPLICATION(S): at 21:18

## 2025-06-01 RX ADMIN — TRAMADOL HYDROCHLORIDE 25 MILLIGRAM(S): 50 TABLET, FILM COATED ORAL at 01:50

## 2025-06-01 RX ADMIN — Medication 650 MILLIGRAM(S): at 21:17

## 2025-06-01 RX ADMIN — DOXAZOSIN MESYLATE 8 MILLIGRAM(S): 8 TABLET ORAL at 21:18

## 2025-06-01 RX ADMIN — Medication 250 MILLIGRAM(S): at 05:11

## 2025-06-01 RX ADMIN — ATORVASTATIN CALCIUM 80 MILLIGRAM(S): 80 TABLET, FILM COATED ORAL at 21:18
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
## 2025-06-01 NOTE — PHYSICAL THERAPY INITIAL EVALUATION ADULT - NSPTDISCHREC_GEN_A_CORE
If pt d/c home would require home PT, assist with all mobility, & DME: RW, mechanical pt lift device (dee), polyfly w/c, & 3:1 commode./Acute Inpatient Rehab

## 2025-06-02 LAB
CULTURE RESULTS: ABNORMAL
GAS PNL BLDA: SIGNIFICANT CHANGE UP
HCT VFR BLD CALC: 36 % — LOW (ref 39–50)
HGB BLD-MCNC: 12.1 G/DL — LOW (ref 13–17)
MCHC RBC-ENTMCNC: 31 PG — SIGNIFICANT CHANGE UP (ref 27–34)
MCHC RBC-ENTMCNC: 33.6 G/DL — SIGNIFICANT CHANGE UP (ref 32–36)
MCV RBC AUTO: 92.3 FL — SIGNIFICANT CHANGE UP (ref 80–100)
NRBC BLD AUTO-RTO: 0 /100 WBCS — SIGNIFICANT CHANGE UP (ref 0–0)
ORGANISM # SPEC MICROSCOPIC CNT: ABNORMAL
ORGANISM # SPEC MICROSCOPIC CNT: ABNORMAL
PLATELET # BLD AUTO: 214 K/UL — SIGNIFICANT CHANGE UP (ref 150–400)
RBC # BLD: 3.9 M/UL — LOW (ref 4.2–5.8)
RBC # FLD: 12.2 % — SIGNIFICANT CHANGE UP (ref 10.3–14.5)
SPECIMEN SOURCE: SIGNIFICANT CHANGE UP
WBC # BLD: 11.49 K/UL — HIGH (ref 3.8–10.5)
WBC # FLD AUTO: 11.49 K/UL — HIGH (ref 3.8–10.5)

## 2025-06-02 RX ADMIN — FINASTERIDE 5 MILLIGRAM(S): 1 TABLET, FILM COATED ORAL at 13:09

## 2025-06-02 RX ADMIN — HEPARIN SODIUM 5000 UNIT(S): 1000 INJECTION INTRAVENOUS; SUBCUTANEOUS at 21:08

## 2025-06-02 RX ADMIN — ATORVASTATIN CALCIUM 80 MILLIGRAM(S): 80 TABLET, FILM COATED ORAL at 21:08

## 2025-06-02 RX ADMIN — POLYETHYLENE GLYCOL 3350 17 GRAM(S): 17 POWDER, FOR SOLUTION ORAL at 17:18

## 2025-06-02 RX ADMIN — Medication 650 MILLIGRAM(S): at 14:00

## 2025-06-02 RX ADMIN — HEPARIN SODIUM 5000 UNIT(S): 1000 INJECTION INTRAVENOUS; SUBCUTANEOUS at 05:22

## 2025-06-02 RX ADMIN — METOPROLOL SUCCINATE 25 MILLIGRAM(S): 50 TABLET, EXTENDED RELEASE ORAL at 17:18

## 2025-06-02 RX ADMIN — Medication 2 TABLET(S): at 21:08

## 2025-06-02 RX ADMIN — Medication 250 MILLIGRAM(S): at 13:09

## 2025-06-02 RX ADMIN — TRAMADOL HYDROCHLORIDE 25 MILLIGRAM(S): 50 TABLET, FILM COATED ORAL at 18:16

## 2025-06-02 RX ADMIN — TRAMADOL HYDROCHLORIDE 25 MILLIGRAM(S): 50 TABLET, FILM COATED ORAL at 23:30

## 2025-06-02 RX ADMIN — DOXAZOSIN MESYLATE 8 MILLIGRAM(S): 8 TABLET ORAL at 21:09

## 2025-06-02 RX ADMIN — Medication 650 MILLIGRAM(S): at 13:10

## 2025-06-02 RX ADMIN — Medication 650 MILLIGRAM(S): at 05:22

## 2025-06-02 RX ADMIN — POLYETHYLENE GLYCOL 3350 17 GRAM(S): 17 POWDER, FOR SOLUTION ORAL at 05:22

## 2025-06-02 RX ADMIN — Medication 250 MILLIGRAM(S): at 05:22

## 2025-06-02 RX ADMIN — Medication 650 MILLIGRAM(S): at 05:52

## 2025-06-02 RX ADMIN — HEPARIN SODIUM 5000 UNIT(S): 1000 INJECTION INTRAVENOUS; SUBCUTANEOUS at 13:09

## 2025-06-02 RX ADMIN — TRAMADOL HYDROCHLORIDE 25 MILLIGRAM(S): 50 TABLET, FILM COATED ORAL at 17:22

## 2025-06-02 RX ADMIN — METOPROLOL SUCCINATE 25 MILLIGRAM(S): 50 TABLET, EXTENDED RELEASE ORAL at 05:22

## 2025-06-02 NOTE — PROGRESS NOTE ADULT - SUBJECTIVE AND OBJECTIVE BOX
Patient seen and examined at bedside.    --Anticoagulation--  heparin   Injectable 5000 Unit(s) SubCutaneous every 8 hours    T(C): 37 (05-31-25 @ 19:00), Max: 37.8 (05-31-25 @ 15:00)  HR: 86 (05-31-25 @ 19:00) (86 - 121)  BP: 156/70 (05-31-25 @ 19:00) (138/74 - 176/89)  RR: 15 (05-31-25 @ 19:00) (15 - 20)  SpO2: 99% (05-31-25 @ 19:00) (96% - 100%)  Wt(kg): --    Exam:  Sleepy, EOS, pupils irregular but reactive, not FC, ERICKSON spont AG R>L         (Mandarin speaking)

## 2025-06-02 NOTE — PROGRESS NOTE ADULT - SUBJECTIVE AND OBJECTIVE BOX
NSCU ATTENDING -- ADDITIONAL PROGRESS NOTE    Nighttime rounds were performed -- please refer to earlier Progress Note for HPI details.    T(C): 37.3 (06-01-25 @ 23:00), Max: 37.3 (06-01-25 @ 12:00)  HR: 106 (06-02-25 @ 00:00) (90 - 122)  BP: 173/75 (06-02-25 @ 00:00) (135/63 - 173/75)  RR: 21 (06-02-25 @ 00:00) (14 - 32)  SpO2: 94% (06-02-25 @ 00:00) (92% - 98%)  Wt(kg): --    Relevant labwork and imaging reviewed.    repeat c-spine CT negative.  will d/w NSG re: clear collar.   NSCU ATTENDING -- ADDITIONAL PROGRESS NOTE    Nighttime rounds were performed -- please refer to earlier Progress Note for HPI details.    T(C): 37.3 (06-01-25 @ 23:00), Max: 37.3 (06-01-25 @ 12:00)  HR: 106 (06-02-25 @ 00:00) (90 - 122)  BP: 173/75 (06-02-25 @ 00:00) (135/63 - 173/75)  RR: 21 (06-02-25 @ 00:00) (14 - 32)  SpO2: 94% (06-02-25 @ 00:00) (92% - 98%)  Wt(kg): --    Relevant labwork and imaging reviewed.    repeat c-spine CT negative.  will d/w NSG --> c-collar removed by myself @ bedside.

## 2025-06-02 NOTE — PROGRESS NOTE ADULT - ASSESSMENT
Bedboard to 4 jaime w/obs     Chest PT q2       - MR Abdomen w/ contrast when able (splenic lesion),    - abd u/s     -Rpt CT c spine neg, no radic with spurling's test. No need for c collar
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
## 2025-06-02 NOTE — PROGRESS NOTE ADULT - ASSESSMENT
ASSESSMENT/PLAN   N  # TBI w/ non comminuted skull fractures   Repeat CT C-spine pending because of original motion-limited CT C-spine.  Kanorado removed 5/28  VPA 250TID until 6/2  TCDs  #Pain: Tylenol iv and tramadol 25  #Activity: [] OOB as tolerated [] Bedrest [x] PT [x] OT [] PMNR    CV   #-180  Within goal    P   extubated 05/28/25.   SpO2 has been above 92%. Is an aspiration risk.     R   Na goal 135-145. Remains at goal.   Voiding. No MAURICE.     GI   #Diet: tube feeding jevity    #Senna miralax   Last BM 5/31   #splenic laceration     ID  Mild leukocytosis. Afebrile. CXR with no consolidation. No hypoxia. Do suspect silent aspiration.   1/2 bottles of blood cx from 05/30 w/ GPC. Likely contaminant. Repeat blood cultures drawn 06/01.   - monitor CBC and fever curve    E  HbA1c 5.8.   - Goal euglycemia (-180). Within goal.       #DVT ppx:   SCDs  Chemoppx   LED negative     #CODE STATUS: FULL CODE   #DISPOSITION: Observation Room on Floor  #CRITICAL

## 2025-06-03 DIAGNOSIS — I25.10 ATHEROSCLEROTIC HEART DISEASE OF NATIVE CORONARY ARTERY WITHOUT ANGINA PECTORIS: ICD-10-CM

## 2025-06-03 DIAGNOSIS — J43.9 EMPHYSEMA, UNSPECIFIED: ICD-10-CM

## 2025-06-03 DIAGNOSIS — R13.10 DYSPHAGIA, UNSPECIFIED: ICD-10-CM

## 2025-06-03 DIAGNOSIS — B36.9 SUPERFICIAL MYCOSIS, UNSPECIFIED: ICD-10-CM

## 2025-06-03 DIAGNOSIS — S06.9XAA UNSPECIFIED INTRACRANIAL INJURY WITH LOSS OF CONSCIOUSNESS STATUS UNKNOWN, INITIAL ENCOUNTER: ICD-10-CM

## 2025-06-03 DIAGNOSIS — Z29.9 ENCOUNTER FOR PROPHYLACTIC MEASURES, UNSPECIFIED: ICD-10-CM

## 2025-06-03 DIAGNOSIS — D72.829 ELEVATED WHITE BLOOD CELL COUNT, UNSPECIFIED: ICD-10-CM

## 2025-06-03 LAB
ANION GAP SERPL CALC-SCNC: 13 MMOL/L — SIGNIFICANT CHANGE UP (ref 5–17)
APPEARANCE UR: CLEAR — SIGNIFICANT CHANGE UP
BACTERIA # UR AUTO: ABNORMAL /HPF
BILIRUB UR-MCNC: NEGATIVE — SIGNIFICANT CHANGE UP
BUN SERPL-MCNC: 25 MG/DL — HIGH (ref 7–23)
CALCIUM SERPL-MCNC: 8.6 MG/DL — SIGNIFICANT CHANGE UP (ref 8.4–10.5)
CAST: 2 /LPF — SIGNIFICANT CHANGE UP (ref 0–4)
CHLORIDE SERPL-SCNC: 108 MMOL/L — SIGNIFICANT CHANGE UP (ref 96–108)
CO2 SERPL-SCNC: 23 MMOL/L — SIGNIFICANT CHANGE UP (ref 22–31)
COLOR SPEC: YELLOW — SIGNIFICANT CHANGE UP
CREAT SERPL-MCNC: 0.86 MG/DL — SIGNIFICANT CHANGE UP (ref 0.5–1.3)
DIFF PNL FLD: ABNORMAL
EGFR: 88 ML/MIN/1.73M2 — SIGNIFICANT CHANGE UP
EGFR: 88 ML/MIN/1.73M2 — SIGNIFICANT CHANGE UP
GLUCOSE SERPL-MCNC: 184 MG/DL — HIGH (ref 70–99)
GLUCOSE UR QL: NEGATIVE MG/DL — SIGNIFICANT CHANGE UP
HCT VFR BLD CALC: 36.6 % — LOW (ref 39–50)
HGB BLD-MCNC: 12.1 G/DL — LOW (ref 13–17)
KETONES UR QL: NEGATIVE MG/DL — SIGNIFICANT CHANGE UP
LEUKOCYTE ESTERASE UR-ACNC: ABNORMAL
MAGNESIUM SERPL-MCNC: 2.6 MG/DL — SIGNIFICANT CHANGE UP (ref 1.6–2.6)
MCHC RBC-ENTMCNC: 31.1 PG — SIGNIFICANT CHANGE UP (ref 27–34)
MCHC RBC-ENTMCNC: 33.1 G/DL — SIGNIFICANT CHANGE UP (ref 32–36)
MCV RBC AUTO: 94.1 FL — SIGNIFICANT CHANGE UP (ref 80–100)
NITRITE UR-MCNC: POSITIVE
NRBC BLD AUTO-RTO: 0 /100 WBCS — SIGNIFICANT CHANGE UP (ref 0–0)
PH UR: 7 — SIGNIFICANT CHANGE UP (ref 5–8)
PHOSPHATE SERPL-MCNC: 3.5 MG/DL — SIGNIFICANT CHANGE UP (ref 2.5–4.5)
PLATELET # BLD AUTO: 234 K/UL — SIGNIFICANT CHANGE UP (ref 150–400)
POTASSIUM SERPL-MCNC: 3.8 MMOL/L — SIGNIFICANT CHANGE UP (ref 3.5–5.3)
POTASSIUM SERPL-SCNC: 3.8 MMOL/L — SIGNIFICANT CHANGE UP (ref 3.5–5.3)
PROT UR-MCNC: 30 MG/DL
RBC # BLD: 3.89 M/UL — LOW (ref 4.2–5.8)
RBC # FLD: 12.4 % — SIGNIFICANT CHANGE UP (ref 10.3–14.5)
RBC CASTS # UR COMP ASSIST: 8 /HPF — HIGH (ref 0–4)
SODIUM SERPL-SCNC: 144 MMOL/L — SIGNIFICANT CHANGE UP (ref 135–145)
SP GR SPEC: >1.03 — HIGH (ref 1–1.03)
SQUAMOUS # UR AUTO: 1 /HPF — SIGNIFICANT CHANGE UP (ref 0–5)
UROBILINOGEN FLD QL: 2 MG/DL (ref 0.2–1)
WBC # BLD: 19.07 K/UL — HIGH (ref 3.8–10.5)
WBC # FLD AUTO: 19.07 K/UL — HIGH (ref 3.8–10.5)
WBC UR QL: 30 /HPF — HIGH (ref 0–5)

## 2025-06-03 RX ORDER — CEFTRIAXONE 500 MG/1
INJECTION, POWDER, FOR SOLUTION INTRAMUSCULAR; INTRAVENOUS
Refills: 0 | Status: DISCONTINUED | OUTPATIENT
Start: 2025-06-03 | End: 2025-06-06

## 2025-06-03 RX ORDER — CEFTRIAXONE 500 MG/1
1000 INJECTION, POWDER, FOR SOLUTION INTRAMUSCULAR; INTRAVENOUS ONCE
Refills: 0 | Status: COMPLETED | OUTPATIENT
Start: 2025-06-03 | End: 2025-06-03

## 2025-06-03 RX ORDER — IPRATROPIUM BROMIDE AND ALBUTEROL SULFATE .5; 2.5 MG/3ML; MG/3ML
3 SOLUTION RESPIRATORY (INHALATION) EVERY 8 HOURS
Refills: 0 | Status: DISCONTINUED | OUTPATIENT
Start: 2025-06-03 | End: 2025-06-10

## 2025-06-03 RX ORDER — ENALAPRIL MALEATE 20 MG
5 TABLET ORAL DAILY
Refills: 0 | Status: DISCONTINUED | OUTPATIENT
Start: 2025-06-03 | End: 2025-06-05

## 2025-06-03 RX ORDER — LABETALOL HYDROCHLORIDE 200 MG/1
10 TABLET, FILM COATED ORAL ONCE
Refills: 0 | Status: COMPLETED | OUTPATIENT
Start: 2025-06-03 | End: 2025-06-03

## 2025-06-03 RX ORDER — CEFTRIAXONE 500 MG/1
1000 INJECTION, POWDER, FOR SOLUTION INTRAMUSCULAR; INTRAVENOUS EVERY 24 HOURS
Refills: 0 | Status: DISCONTINUED | OUTPATIENT
Start: 2025-06-04 | End: 2025-06-06

## 2025-06-03 RX ADMIN — HEPARIN SODIUM 5000 UNIT(S): 1000 INJECTION INTRAVENOUS; SUBCUTANEOUS at 14:43

## 2025-06-03 RX ADMIN — FINASTERIDE 5 MILLIGRAM(S): 1 TABLET, FILM COATED ORAL at 16:43

## 2025-06-03 RX ADMIN — TRAMADOL HYDROCHLORIDE 25 MILLIGRAM(S): 50 TABLET, FILM COATED ORAL at 05:40

## 2025-06-03 RX ADMIN — METOPROLOL SUCCINATE 25 MILLIGRAM(S): 50 TABLET, EXTENDED RELEASE ORAL at 17:25

## 2025-06-03 RX ADMIN — TRAMADOL HYDROCHLORIDE 25 MILLIGRAM(S): 50 TABLET, FILM COATED ORAL at 00:00

## 2025-06-03 RX ADMIN — METOPROLOL SUCCINATE 25 MILLIGRAM(S): 50 TABLET, EXTENDED RELEASE ORAL at 05:14

## 2025-06-03 RX ADMIN — Medication 1000 MILLILITER(S): at 00:33

## 2025-06-03 RX ADMIN — LABETALOL HYDROCHLORIDE 10 MILLIGRAM(S): 200 TABLET, FILM COATED ORAL at 00:33

## 2025-06-03 RX ADMIN — HEPARIN SODIUM 5000 UNIT(S): 1000 INJECTION INTRAVENOUS; SUBCUTANEOUS at 05:13

## 2025-06-03 RX ADMIN — ATORVASTATIN CALCIUM 80 MILLIGRAM(S): 80 TABLET, FILM COATED ORAL at 21:16

## 2025-06-03 RX ADMIN — IPRATROPIUM BROMIDE AND ALBUTEROL SULFATE 3 MILLILITER(S): .5; 2.5 SOLUTION RESPIRATORY (INHALATION) at 21:17

## 2025-06-03 RX ADMIN — TRAMADOL HYDROCHLORIDE 25 MILLIGRAM(S): 50 TABLET, FILM COATED ORAL at 06:10

## 2025-06-03 RX ADMIN — IPRATROPIUM BROMIDE AND ALBUTEROL SULFATE 3 MILLILITER(S): .5; 2.5 SOLUTION RESPIRATORY (INHALATION) at 08:58

## 2025-06-03 RX ADMIN — DOXAZOSIN MESYLATE 8 MILLIGRAM(S): 8 TABLET ORAL at 21:16

## 2025-06-03 RX ADMIN — POLYETHYLENE GLYCOL 3350 17 GRAM(S): 17 POWDER, FOR SOLUTION ORAL at 05:13

## 2025-06-03 RX ADMIN — HEPARIN SODIUM 5000 UNIT(S): 1000 INJECTION INTRAVENOUS; SUBCUTANEOUS at 21:16

## 2025-06-03 RX ADMIN — POLYETHYLENE GLYCOL 3350 17 GRAM(S): 17 POWDER, FOR SOLUTION ORAL at 17:25

## 2025-06-03 RX ADMIN — CEFTRIAXONE 100 MILLIGRAM(S): 500 INJECTION, POWDER, FOR SOLUTION INTRAMUSCULAR; INTRAVENOUS at 16:43

## 2025-06-03 RX ADMIN — Medication 2 TABLET(S): at 21:17

## 2025-06-03 RX ADMIN — IPRATROPIUM BROMIDE AND ALBUTEROL SULFATE 3 MILLILITER(S): .5; 2.5 SOLUTION RESPIRATORY (INHALATION) at 14:07

## 2025-06-03 RX ADMIN — Medication 5 MILLIGRAM(S): at 16:43

## 2025-06-03 NOTE — PROVIDER CONTACT NOTE (OTHER) - ACTION/TREATMENT ORDERED:
NSCU team at bedside. Staples placed and emergent CTH ordered.
Provider made aware. 4 L face tent ordered
Provider made aware. Labetalol IVP and 500 cc bolus ordered. LE dopplers ordered.

## 2025-06-03 NOTE — PROVIDER CONTACT NOTE (OTHER) - SITUATION
Patient removed ICP bolt from head while being cleaned.
HR sustaining 120-130 s/p tramadol
O2 sat sustaining 90-91%

## 2025-06-03 NOTE — PROVIDER CONTACT NOTE (OTHER) - ASSESSMENT
Pt HR sustaining 120-130 on tele monitor. /88, 99.6 F axillary, O2 94%. No other s/s of pain.
Patient is restless and trying to pull at tubes and drains. BP: 164/78, HR: 106, O2 Sat: 97, RR: 35
Pt O2 sat sustaining 90-91% on room air. No other s/s of respiratory distress. No neurological changes. All other VSS.

## 2025-06-03 NOTE — CONSULT NOTE ADULT - PROBLEM SELECTOR RECOMMENDATION 9
- Chest xray to confirm placement  - Diet per SLP  - No further ENT intervention at this time   - Call with questions or concerns
With contusions /SDH  Management per neurosurgery  On tramadol for pain   Overall poor mentation
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
## 2025-06-03 NOTE — CONSULT NOTE ADULT - PROBLEM SELECTOR RECOMMENDATION 5
ENT saw, rec acetic acid ear drops, 5 drops BID to right ear  x14 days NGT replaced by ENT today  If mentation improves, can consider S/S eval but likely will need PEG discussion w/ family

## 2025-06-03 NOTE — CONSULT NOTE ADULT - PROBLEM SELECTOR RECOMMENDATION 4
NGT replaced by ENT today  If mentation improves, can consider S/S eval but likely will need PEG discussion w/ family Cardiologist: UBALDO Simental  Last visit was 4/30/25, note reviewed. Patient had self discontinued statin for muscle cramps but was advised to resume. Medications were Plavix 75, enalapril 5, metoprolol succinate 50mg daily, atorvastatin 20mg daily    s/p PCI to D1 1 stent in 2011. Was recommended for TTE + stress for SOB
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
## 2025-06-03 NOTE — PROGRESS NOTE ADULT - SUBJECTIVE AND OBJECTIVE BOX
SUBJECTIVE:   No a/c distress  OVERNIGHT EVENTS: none    Vital Signs Last 24 Hrs  T(C): 36.9 (2025 08:00), Max: 37.6 (2025 00:20)  T(F): 98.4 (2025 08:00), Max: 99.6 (2025 00:20)  HR: 97 (2025 08:00) (96 - 118)  BP: 126/71 (2025 08:00) (126/71 - 174/78)  BP(mean): 106 (2025 19:00) (97 - 112)  RR: 19 (2025 08:00) (14 - 22)  SpO2: 97% (2025 08:00) (93% - 97%)    Parameters below as of 2025 08:00  Patient On (Oxygen Delivery Method): face tent        PHYSICAL EXAM:    Constitutional: No Acute Distress     Neurological: Somnolent Opens eyes with tactile stimuli. No verbal output , Does not follow commands. Moves all extremities spontaneously anti gravity     Pulmonary: Coarse upper airway BS     Cards S1 S2+     Gastrointestinal: Soft, Non-tender, Non-distended     Extremities: No calf tenderness       LABS:                        12.1   19.07 )-----------( 234      ( 2025 07:38 )             36.6    06-03    144  |  108  |  25[H]  ----------------------------<  184[H]  3.8   |  23  |  0.86    Ca    8.6      2025 07:38  Phos  3.5     06-03  Mg     2.6     06-03        IMAGIN/29 CT head- Grossly unchanged multifocal intraparenchymal hemorrhages, most pronounced involving the left frontal and temporal lobes. Index hemorrhage left frontal lobe measures approximately 3.6 x 3.1 cm, unchanged since prior study (1:25). No midline shift.  EXTRA-AXIAL: Stable right frontotemporal and left frontoparietal subdural hematomas. Unchanged small amount of hemorrhage involving the anterior falx and biparietal subarachnoid hemorrhage    MEDICATIONS:    acetaminophen     Tablet .. 650 milliGRAM(s) Oral every 6 hours PRN Temp greater or equal to 38C (100.4F), Mild Pain (1 - 3)  traMADol 25 milliGRAM(s) Oral every 6 hours PRN Moderate Pain (4 - 6)  doxazosin 8 milliGRAM(s) Oral at bedtime  metoprolol tartrate 25 milliGRAM(s) Oral two times a day  albuterol/ipratropium for Nebulization 3 milliLiter(s) Nebulizer every 8 hours  bisacodyl Suppository 10 milliGRAM(s) Rectal daily PRN Constipation  polyethylene glycol 3350 17 Gram(s) Oral two times a day  senna 2 Tablet(s) Oral at bedtime  atorvastatin 80 milliGRAM(s) Oral at bedtime  finasteride 5 milliGRAM(s) Oral daily  heparin   Injectable 5000 Unit(s) SubCutaneous every 8 hours      DIET:

## 2025-06-03 NOTE — CONSULT NOTE ADULT - PROBLEM SELECTOR RECOMMENDATION 2
- Recommend Acetic acid ear drops, 5 drops BID to right ear  x14 days   - Patient should follow up in ENT office as an outpatient. May see Dr. Lee, Dr. Saini, Dr. Velazquez, Dr. Wiseman. Call [phone].
Afebrile, prior infectious w/u neg  CTA reviewed, no PE on radiology read, no consolidations. Some emphysema and atelectasis   Repeat UA, bcx  Cover empirically w/ ceftriaxone for now pending repeat cx  Monitor CBC, fever curve. Tmax 99

## 2025-06-03 NOTE — PROVIDER CONTACT NOTE (OTHER) - BACKGROUND
TBI
Patient CTH positive for L/R frontotemporal contusions/SAH, anterior parafalcine SDH, with right temporal bone fracture extending to sphenoid bone. ICP bolt placed in NSCU.
TBI
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
## 2025-06-04 DIAGNOSIS — I82.409 ACUTE EMBOLISM AND THROMBOSIS OF UNSPECIFIED DEEP VEINS OF UNSPECIFIED LOWER EXTREMITY: ICD-10-CM

## 2025-06-04 LAB
ANION GAP SERPL CALC-SCNC: 12 MMOL/L — SIGNIFICANT CHANGE UP (ref 5–17)
BASOPHILS # BLD AUTO: 0.04 K/UL — SIGNIFICANT CHANGE UP (ref 0–0.2)
BASOPHILS NFR BLD AUTO: 0.3 % — SIGNIFICANT CHANGE UP (ref 0–2)
BUN SERPL-MCNC: 26 MG/DL — HIGH (ref 7–23)
CALCIUM SERPL-MCNC: 8.8 MG/DL — SIGNIFICANT CHANGE UP (ref 8.4–10.5)
CHLORIDE SERPL-SCNC: 112 MMOL/L — HIGH (ref 96–108)
CO2 SERPL-SCNC: 22 MMOL/L — SIGNIFICANT CHANGE UP (ref 22–31)
CREAT SERPL-MCNC: 0.82 MG/DL — SIGNIFICANT CHANGE UP (ref 0.5–1.3)
CULTURE RESULTS: SIGNIFICANT CHANGE UP
CULTURE RESULTS: SIGNIFICANT CHANGE UP
EGFR: 89 ML/MIN/1.73M2 — SIGNIFICANT CHANGE UP
EGFR: 89 ML/MIN/1.73M2 — SIGNIFICANT CHANGE UP
EOSINOPHIL # BLD AUTO: 0.13 K/UL — SIGNIFICANT CHANGE UP (ref 0–0.5)
EOSINOPHIL NFR BLD AUTO: 1 % — SIGNIFICANT CHANGE UP (ref 0–6)
GLUCOSE SERPL-MCNC: 130 MG/DL — HIGH (ref 70–99)
HCT VFR BLD CALC: 38 % — LOW (ref 39–50)
HGB BLD-MCNC: 12.5 G/DL — LOW (ref 13–17)
IMM GRANULOCYTES NFR BLD AUTO: 0.4 % — SIGNIFICANT CHANGE UP (ref 0–0.9)
LYMPHOCYTES # BLD AUTO: 1.7 K/UL — SIGNIFICANT CHANGE UP (ref 1–3.3)
LYMPHOCYTES # BLD AUTO: 13.4 % — SIGNIFICANT CHANGE UP (ref 13–44)
MCHC RBC-ENTMCNC: 31.2 PG — SIGNIFICANT CHANGE UP (ref 27–34)
MCHC RBC-ENTMCNC: 32.9 G/DL — SIGNIFICANT CHANGE UP (ref 32–36)
MCV RBC AUTO: 94.8 FL — SIGNIFICANT CHANGE UP (ref 80–100)
MONOCYTES # BLD AUTO: 1.07 K/UL — HIGH (ref 0–0.9)
MONOCYTES NFR BLD AUTO: 8.4 % — SIGNIFICANT CHANGE UP (ref 2–14)
NEUTROPHILS # BLD AUTO: 9.71 K/UL — HIGH (ref 1.8–7.4)
NEUTROPHILS NFR BLD AUTO: 76.5 % — SIGNIFICANT CHANGE UP (ref 43–77)
NRBC BLD AUTO-RTO: 0 /100 WBCS — SIGNIFICANT CHANGE UP (ref 0–0)
PLATELET # BLD AUTO: 233 K/UL — SIGNIFICANT CHANGE UP (ref 150–400)
POTASSIUM SERPL-MCNC: 3.7 MMOL/L — SIGNIFICANT CHANGE UP (ref 3.5–5.3)
POTASSIUM SERPL-SCNC: 3.7 MMOL/L — SIGNIFICANT CHANGE UP (ref 3.5–5.3)
RBC # BLD: 4.01 M/UL — LOW (ref 4.2–5.8)
RBC # FLD: 12.4 % — SIGNIFICANT CHANGE UP (ref 10.3–14.5)
SODIUM SERPL-SCNC: 146 MMOL/L — HIGH (ref 135–145)
SPECIMEN SOURCE: SIGNIFICANT CHANGE UP
SPECIMEN SOURCE: SIGNIFICANT CHANGE UP
WBC # BLD: 12.7 K/UL — HIGH (ref 3.8–10.5)
WBC # FLD AUTO: 12.7 K/UL — HIGH (ref 3.8–10.5)

## 2025-06-04 RX ORDER — QUETIAPINE FUMARATE 25 MG/1
12.5 TABLET ORAL AT BEDTIME
Refills: 0 | Status: DISCONTINUED | OUTPATIENT
Start: 2025-06-04 | End: 2025-06-05

## 2025-06-04 RX ORDER — SODIUM CHLORIDE 9 G/1000ML
1000 INJECTION, SOLUTION INTRAVENOUS
Refills: 0 | Status: DISCONTINUED | OUTPATIENT
Start: 2025-06-04 | End: 2025-06-05

## 2025-06-04 RX ADMIN — POLYETHYLENE GLYCOL 3350 17 GRAM(S): 17 POWDER, FOR SOLUTION ORAL at 05:18

## 2025-06-04 RX ADMIN — Medication 650 MILLIGRAM(S): at 10:20

## 2025-06-04 RX ADMIN — TRAMADOL HYDROCHLORIDE 25 MILLIGRAM(S): 50 TABLET, FILM COATED ORAL at 00:34

## 2025-06-04 RX ADMIN — METOPROLOL SUCCINATE 25 MILLIGRAM(S): 50 TABLET, EXTENDED RELEASE ORAL at 05:18

## 2025-06-04 RX ADMIN — IPRATROPIUM BROMIDE AND ALBUTEROL SULFATE 3 MILLILITER(S): .5; 2.5 SOLUTION RESPIRATORY (INHALATION) at 21:37

## 2025-06-04 RX ADMIN — FINASTERIDE 5 MILLIGRAM(S): 1 TABLET, FILM COATED ORAL at 11:28

## 2025-06-04 RX ADMIN — CEFTRIAXONE 100 MILLIGRAM(S): 500 INJECTION, POWDER, FOR SOLUTION INTRAMUSCULAR; INTRAVENOUS at 17:28

## 2025-06-04 RX ADMIN — DOXAZOSIN MESYLATE 8 MILLIGRAM(S): 8 TABLET ORAL at 21:37

## 2025-06-04 RX ADMIN — TRAMADOL HYDROCHLORIDE 25 MILLIGRAM(S): 50 TABLET, FILM COATED ORAL at 01:04

## 2025-06-04 RX ADMIN — Medication 650 MILLIGRAM(S): at 09:50

## 2025-06-04 RX ADMIN — QUETIAPINE FUMARATE 12.5 MILLIGRAM(S): 25 TABLET ORAL at 21:37

## 2025-06-04 RX ADMIN — METOPROLOL SUCCINATE 25 MILLIGRAM(S): 50 TABLET, EXTENDED RELEASE ORAL at 17:29

## 2025-06-04 RX ADMIN — IPRATROPIUM BROMIDE AND ALBUTEROL SULFATE 3 MILLILITER(S): .5; 2.5 SOLUTION RESPIRATORY (INHALATION) at 05:18

## 2025-06-04 RX ADMIN — HEPARIN SODIUM 5000 UNIT(S): 1000 INJECTION INTRAVENOUS; SUBCUTANEOUS at 13:04

## 2025-06-04 RX ADMIN — POLYETHYLENE GLYCOL 3350 17 GRAM(S): 17 POWDER, FOR SOLUTION ORAL at 17:28

## 2025-06-04 RX ADMIN — SODIUM CHLORIDE 75 MILLILITER(S): 9 INJECTION, SOLUTION INTRAVENOUS at 17:28

## 2025-06-04 RX ADMIN — HEPARIN SODIUM 5000 UNIT(S): 1000 INJECTION INTRAVENOUS; SUBCUTANEOUS at 05:18

## 2025-06-04 RX ADMIN — IPRATROPIUM BROMIDE AND ALBUTEROL SULFATE 3 MILLILITER(S): .5; 2.5 SOLUTION RESPIRATORY (INHALATION) at 13:04

## 2025-06-04 RX ADMIN — ATORVASTATIN CALCIUM 80 MILLIGRAM(S): 80 TABLET, FILM COATED ORAL at 21:37

## 2025-06-04 RX ADMIN — HEPARIN SODIUM 5000 UNIT(S): 1000 INJECTION INTRAVENOUS; SUBCUTANEOUS at 21:36

## 2025-06-04 NOTE — SWALLOW BEDSIDE ASSESSMENT ADULT - ASR SWALLOW RECOMMEND DIAG
Does not appear to be indicated, however,  note, IF pt requires objective swallow testing, pt likely NOT a candidate for FEES i/s/o sphenoid fracture
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
## 2025-06-04 NOTE — PROGRESS NOTE ADULT - PROBLEM SELECTOR PLAN 5
UBALDO Simental  Last visit was 4/30/25, note reviewed. Patient had self discontinued statin for muscle cramps but was advised to resume. Medications were Plavix 75, enalapril 5, metoprolol succinate 50mg daily, atorvastatin 20mg daily    s/p PCI to D1 1 stent in 2011. Was recommended for TTE + stress for SOB. Can be considered as outpatient when would be cleared for DAPT

## 2025-06-04 NOTE — SWALLOW BEDSIDE ASSESSMENT ADULT - ASPIRATION PRECAUTIONS
Monitor for s/s aspiration/laryngeal penetration. If noted:  D/C p.o. intake, provide non-oral nutrition/hydration/meds, and contact this service @ [phone]
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
## 2025-06-04 NOTE — PROGRESS NOTE ADULT - NS ATTEND AMEND GEN_ALL_CORE FT
Pt seen on 6/4/25. Pt alert, awake, being treated for UTI.  Pt with incomprehensible sounds, but more attempt at speech than previously, not just grunting.  Followed simple command.  Pt for speech swallow evaluation.  Pending acute rehab.
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
## 2025-06-04 NOTE — PROGRESS NOTE ADULT - SUBJECTIVE AND OBJECTIVE BOX
[facility] Division of Hospital Medicine  Cassidynaomi Medley DO   Available via [employer]      Patient is a 79y old  Male who presents with a chief complaint of TSAH (04 Jun 2025 13:37)      SUBJECTIVE / OVERNIGHT EVENTS:  Tried to use mandarin  Bowen [id number] but not interacting     MEDICATIONS  (STANDING):  albuterol/ipratropium for Nebulization 3 milliLiter(s) Nebulizer every 8 hours  atorvastatin 80 milliGRAM(s) Oral at bedtime  cefTRIAXone   IVPB 1000 milliGRAM(s) IV Intermittent every 24 hours  cefTRIAXone   IVPB      doxazosin 8 milliGRAM(s) Oral at bedtime  enalapril 5 milliGRAM(s) Oral daily  finasteride 5 milliGRAM(s) Oral daily  heparin   Injectable 5000 Unit(s) SubCutaneous every 8 hours  metoprolol tartrate 25 milliGRAM(s) Oral two times a day  polyethylene glycol 3350 17 Gram(s) Oral two times a day  senna 2 Tablet(s) Oral at bedtime    MEDICATIONS  (PRN):  acetaminophen     Tablet .. 650 milliGRAM(s) Oral every 6 hours PRN Temp greater or equal to 38C (100.4F), Mild Pain (1 - 3)  bisacodyl Suppository 10 milliGRAM(s) Rectal daily PRN Constipation  traMADol 25 milliGRAM(s) Oral every 6 hours PRN Moderate Pain (4 - 6)      CAPILLARY BLOOD GLUCOSE        I&O's Summary    03 Jun 2025 07:01  -  04 Jun 2025 07:00  --------------------------------------------------------  IN: 0 mL / OUT: 500 mL / NET: -500 mL        PHYSICAL EXAM:  Vital Signs Last 24 Hrs  T(C): 36.3 (04 Jun 2025 13:00), Max: 37.3 (04 Jun 2025 04:39)  T(F): 97.4 (04 Jun 2025 13:00), Max: 99.1 (04 Jun 2025 04:39)  HR: 77 (04 Jun 2025 13:00) (77 - 113)  BP: 143/75 (04 Jun 2025 13:00) (129/74 - 181/83)  BP(mean): --  RR: 18 (04 Jun 2025 13:00) (18 - 18)  SpO2: 96% (04 Jun 2025 13:00) (94% - 98%)    Parameters below as of 04 Jun 2025 04:39  Patient On (Oxygen Delivery Method): face tent      CONSTITUTIONAL: NAD, well-developed  RESPIRATORY: Normal respiratory effort; lungs are clear to auscultation bilaterally  CARDIOVASCULAR: RRR, normal S1 and S2; No lower extremity edema  ABDOMEN: Nontender to palpation, normoactive bowel sounds  MUSCULOSKELETAL: no clubbing or cyanosis of digits; no joint swelling or tenderness to palpation  PSYCH: alert, appears to try and speak   NEUROLOGY: not following commands in english or mandarin   SKIN: No rashes; ecchymosis, scalp staples c/d/i     LABS:                        12.5   12.70 )-----------( 233      ( 04 Jun 2025 05:14 )             38.0     06-04    146[H]  |  112[H]  |  26[H]  ----------------------------<  130[H]  3.7   |  22  |  0.82    Ca    8.8      04 Jun 2025 05:14  Phos  3.5     06-03  Mg     2.6     06-03            Urinalysis Basic - ( 04 Jun 2025 05:14 )    Color: x / Appearance: x / SG: x / pH: x  Gluc: 130 mg/dL / Ketone: x  / Bili: x / Urobili: x   Blood: x / Protein: x / Nitrite: x   Leuk Esterase: x / RBC: x / WBC x   Sq Epi: x / Non Sq Epi: x / Bacteria: x        Culture - Blood (collected 01 Jun 2025 17:38)  Source: Blood Blood  Preliminary Report (03 Jun 2025 22:01):    No growth at 48 Hours    Culture - Blood (collected 01 Jun 2025 17:37)  Source: Blood Blood  Preliminary Report (03 Jun 2025 22:01):    No growth at 48 Hours        RADIOLOGY & ADDITIONAL TESTS:  Results Reviewed:   Imaging Personally Reviewed:  Electrocardiogram Personally Reviewed:    COORDINATION OF CARE:  Care Discussed with Consultants/Other Providers [Y/N]: neurosurgery   Prior or Outpatient Records Reviewed [Y/N]:

## 2025-06-04 NOTE — SWALLOW BEDSIDE ASSESSMENT ADULT - COMMENTS
ENT placed NGT and noted +copious dry secretions diffuse throughout larynx - difficult to evaluate structures, +redundant tissue c/w sleep apnea    5/29 CTH FINDINGS:  VENTRICLES AND SULCI: No hydrocephalus. Redemonstrated layering hemorrhage in the left occipital horn. INTRA-AXIAL: Grossly unchanged multifocal intraparenchymal hemorrhages, most pronounced involving the left frontal and temporal lobes. Index hemorrhage left frontal lobe measures approximately 3.6 x 3.1 cm, unchanged since prior study (1:25). No midline shift.  EXTRA-AXIAL: Stable right frontotemporal and left frontoparietal subdural hematomas. Unchanged small amount of hemorrhage involving the anterior falx and biparietal subarachnoid hemorrhage. Basal cisterns are normal in appearance.  CXR 5/26 No focal consolidation.    No prior SLP exams in [facility] or [facility] in [facility]
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
## 2025-06-04 NOTE — CONSULT NOTE ADULT - CONSULT REQUESTED DATE/TIME
04-Jun-2025 15:14
29-May-2025 07:39
02-Jun-2025 13:32
03-Jun-2025 14:46
26-May-2025 13:42
03-Jun-2025

## 2025-06-04 NOTE — SWALLOW BEDSIDE ASSESSMENT ADULT - SLP GENERAL OBSERVATIONS
pt received upright at bedside, breathing comfortably on room air, deeply breathing and generally grunting throughout exam (baseline grunting per report from RN from son), +b/l wrist restraints +NGT. Pt attempting to communication and able to nod (but inconsistently and also inaccurate for orientation); and only had been able to somewhat say 'good' in Mandarin, somewhat able to follow two 1-step directives to open mouth and vocalize, but no other directives. Session conducted in Mandarin with this Mandarin-English speaking clinician.

## 2025-06-04 NOTE — SWALLOW BEDSIDE ASSESSMENT ADULT - ORAL PREPARATORY PHASE
Within functional limits mastication intact HOWEVER, PT BEGINNING to fall asleep while chewing requiring verbal cueing to maintain alertness

## 2025-06-05 LAB
ANION GAP SERPL CALC-SCNC: 14 MMOL/L — SIGNIFICANT CHANGE UP (ref 5–17)
BUN SERPL-MCNC: 24 MG/DL — HIGH (ref 7–23)
CALCIUM SERPL-MCNC: 8.8 MG/DL — SIGNIFICANT CHANGE UP (ref 8.4–10.5)
CHLORIDE SERPL-SCNC: 109 MMOL/L — HIGH (ref 96–108)
CO2 SERPL-SCNC: 21 MMOL/L — LOW (ref 22–31)
CREAT SERPL-MCNC: 0.79 MG/DL — SIGNIFICANT CHANGE UP (ref 0.5–1.3)
EGFR: 90 ML/MIN/1.73M2 — SIGNIFICANT CHANGE UP
EGFR: 90 ML/MIN/1.73M2 — SIGNIFICANT CHANGE UP
GLUCOSE SERPL-MCNC: 108 MG/DL — HIGH (ref 70–99)
POTASSIUM SERPL-MCNC: 4.3 MMOL/L — SIGNIFICANT CHANGE UP (ref 3.5–5.3)
POTASSIUM SERPL-SCNC: 4.3 MMOL/L — SIGNIFICANT CHANGE UP (ref 3.5–5.3)
SODIUM SERPL-SCNC: 144 MMOL/L — SIGNIFICANT CHANGE UP (ref 135–145)

## 2025-06-05 RX ORDER — ATORVASTATIN CALCIUM 80 MG/1
80 TABLET, FILM COATED ORAL AT BEDTIME
Refills: 0 | Status: DISCONTINUED | OUTPATIENT
Start: 2025-06-05 | End: 2025-06-10

## 2025-06-05 RX ORDER — DOXAZOSIN MESYLATE 8 MG/1
8 TABLET ORAL AT BEDTIME
Refills: 0 | Status: DISCONTINUED | OUTPATIENT
Start: 2025-06-05 | End: 2025-06-10

## 2025-06-05 RX ORDER — QUETIAPINE FUMARATE 25 MG/1
12.5 TABLET ORAL AT BEDTIME
Refills: 0 | Status: DISCONTINUED | OUTPATIENT
Start: 2025-06-05 | End: 2025-06-10

## 2025-06-05 RX ORDER — SENNA 187 MG
2 TABLET ORAL AT BEDTIME
Refills: 0 | Status: DISCONTINUED | OUTPATIENT
Start: 2025-06-05 | End: 2025-06-10

## 2025-06-05 RX ORDER — METOPROLOL SUCCINATE 50 MG/1
25 TABLET, EXTENDED RELEASE ORAL
Refills: 0 | Status: DISCONTINUED | OUTPATIENT
Start: 2025-06-05 | End: 2025-06-10

## 2025-06-05 RX ORDER — ENALAPRIL MALEATE 20 MG
5 TABLET ORAL EVERY 12 HOURS
Refills: 0 | Status: DISCONTINUED | OUTPATIENT
Start: 2025-06-05 | End: 2025-06-10

## 2025-06-05 RX ORDER — ACETIC ACID 2 %
5 SOLUTION, NON-ORAL OTIC (EAR)
Refills: 0 | Status: DISCONTINUED | OUTPATIENT
Start: 2025-06-05 | End: 2025-06-10

## 2025-06-05 RX ADMIN — CEFTRIAXONE 100 MILLIGRAM(S): 500 INJECTION, POWDER, FOR SOLUTION INTRAMUSCULAR; INTRAVENOUS at 15:46

## 2025-06-05 RX ADMIN — QUETIAPINE FUMARATE 12.5 MILLIGRAM(S): 25 TABLET ORAL at 21:26

## 2025-06-05 RX ADMIN — HEPARIN SODIUM 5000 UNIT(S): 1000 INJECTION INTRAVENOUS; SUBCUTANEOUS at 05:10

## 2025-06-05 RX ADMIN — Medication 5 MILLIGRAM(S): at 05:10

## 2025-06-05 RX ADMIN — FINASTERIDE 5 MILLIGRAM(S): 1 TABLET, FILM COATED ORAL at 12:08

## 2025-06-05 RX ADMIN — HEPARIN SODIUM 5000 UNIT(S): 1000 INJECTION INTRAVENOUS; SUBCUTANEOUS at 21:26

## 2025-06-05 RX ADMIN — METOPROLOL SUCCINATE 25 MILLIGRAM(S): 50 TABLET, EXTENDED RELEASE ORAL at 17:12

## 2025-06-05 RX ADMIN — DOXAZOSIN MESYLATE 8 MILLIGRAM(S): 8 TABLET ORAL at 21:26

## 2025-06-05 RX ADMIN — Medication 5 MILLIGRAM(S): at 17:11

## 2025-06-05 RX ADMIN — METOPROLOL SUCCINATE 25 MILLIGRAM(S): 50 TABLET, EXTENDED RELEASE ORAL at 05:10

## 2025-06-05 RX ADMIN — HEPARIN SODIUM 5000 UNIT(S): 1000 INJECTION INTRAVENOUS; SUBCUTANEOUS at 12:08

## 2025-06-05 RX ADMIN — Medication 650 MILLIGRAM(S): at 17:41

## 2025-06-05 RX ADMIN — Medication 650 MILLIGRAM(S): at 17:11

## 2025-06-05 RX ADMIN — IPRATROPIUM BROMIDE AND ALBUTEROL SULFATE 3 MILLILITER(S): .5; 2.5 SOLUTION RESPIRATORY (INHALATION) at 21:26

## 2025-06-05 RX ADMIN — Medication 5 DROP(S): at 17:46

## 2025-06-05 RX ADMIN — ATORVASTATIN CALCIUM 80 MILLIGRAM(S): 80 TABLET, FILM COATED ORAL at 21:26

## 2025-06-05 RX ADMIN — IPRATROPIUM BROMIDE AND ALBUTEROL SULFATE 3 MILLILITER(S): .5; 2.5 SOLUTION RESPIRATORY (INHALATION) at 14:38

## 2025-06-05 RX ADMIN — IPRATROPIUM BROMIDE AND ALBUTEROL SULFATE 3 MILLILITER(S): .5; 2.5 SOLUTION RESPIRATORY (INHALATION) at 05:10
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
## 2025-06-05 NOTE — PROGRESS NOTE ADULT - SUBJECTIVE AND OBJECTIVE BOX
Vascular Cardiology Consult Note    DIRECT SERVICE NUMBER:  [phone]                 CC:  DVT    HPI:     no complaints    Allergies    Allergy Status Unknown    Intolerances    	   MEDICATIONS  (STANDING):  albuterol/ipratropium for Nebulization 3 milliLiter(s) Nebulizer every 8 hours  atorvastatin 80 milliGRAM(s) Oral at bedtime  cefTRIAXone   IVPB      cefTRIAXone   IVPB 1000 milliGRAM(s) IV Intermittent every 24 hours  doxazosin 8 milliGRAM(s) Oral at bedtime  enalapril 5 milliGRAM(s) Oral daily  finasteride 5 milliGRAM(s) Oral daily  heparin   Injectable 5000 Unit(s) SubCutaneous every 8 hours  metoprolol tartrate 25 milliGRAM(s) Oral two times a day  polyethylene glycol 3350 17 Gram(s) Oral two times a day  senna 2 Tablet(s) Oral at bedtime  sodium chloride 0.45%. 1000 milliLiter(s) (75 mL/Hr) IV Continuous <Continuous>        PAST MEDICAL & SURGICAL HISTORY:      FAMILY HISTORY:      SOCIAL HISTORY:  unchanged    REVIEW OF SYSTEMS:     [x ] Unable to obtain      ICU Vital Signs Last 24 Hrs  T(C): 36.6 (05 Jun 2025 13:07), Max: 37 (05 Jun 2025 05:09)  T(F): 97.8 (05 Jun 2025 13:07), Max: 98.6 (05 Jun 2025 05:09)  HR: 86 (05 Jun 2025 13:07) (81 - 103)  BP: 148/83 (05 Jun 2025 13:07) (148/83 - 184/79)  BP(mean): --  ABP: --  ABP(mean): --  RR: 20 (05 Jun 2025 13:07) (18 - 20)  SpO2: 95% (05 Jun 2025 13:07) (95% - 100%)    O2 Parameters below as of 05 Jun 2025 13:07  Patient On (Oxygen Delivery Method): room air           NAD  in restraints  Clear lungs   S1S2 RRR  Mild edema bilaterally                             12.5   12.70 )-----------( 233      ( 04 Jun 2025 05:14 )             38.0             Assessment:  1. BTK DVT - L soleal  2.  No PE on CTPA  3.  CAD with PCI  4.  HTN    Plan:  1. Cont HSQ 5000 every 8 hours for now  2.  Repeat venous duplex Monday   3.  No PE noted on CTPA  4.  Cont enalapril   5.  Will follow with you            Thank you    Dallin Kathleen    Vascular Cardiology Service    Please call with any questions:   DIRECT SERVICE NUMBER:  [phone]

## 2025-06-05 NOTE — PROGRESS NOTE ADULT - PROBLEM SELECTOR PLAN 5
UBALDO Simental  Last visit was 4/30/25, note reviewed. Patient had self discontinued statin for muscle cramps but was advised to resume. Medications were Plavix 75, enalapril 5, metoprolol succinate 50mg daily, atorvastatin 20mg daily    s/p PCI to D1 1 stent in 2011. Was recommended for TTE + stress for SOB. Can be considered as outpatient when would be cleared for DAPT UBALDO Simental  Last visit was 4/30/25, note reviewed. Patient had self discontinued statin for muscle cramps but was advised to resume. Medications were Plavix 75, enalapril 5, metoprolol succinate 50mg daily, atorvastatin 20mg daily    s/p PCI to D1 1 stent in 2011. Was recommended for TTE + stress for SOB. Can be considered as outpatient when would be cleared for DAPT    HTN- increase enalapril to 10mg
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
## 2025-06-05 NOTE — PROGRESS NOTE ADULT - SUBJECTIVE AND OBJECTIVE BOX
SUBJECTIVE:   More awake Restless at times   OVERNIGHT EVENTS: none    Vital Signs Last 24 Hrs  T(C): 36.6 (05 Jun 2025 13:07), Max: 37 (05 Jun 2025 05:09)  T(F): 97.8 (05 Jun 2025 13:07), Max: 98.6 (05 Jun 2025 05:09)  HR: 86 (05 Jun 2025 13:07) (81 - 103)  BP: 148/83 (05 Jun 2025 13:07) (148/83 - 184/79)  BP(mean): --  RR: 20 (05 Jun 2025 13:07) (18 - 20)  SpO2: 95% (05 Jun 2025 13:07) (95% - 100%)    Parameters below as of 05 Jun 2025 13:07  Patient On (Oxygen Delivery Method): room air        PHYSICAL EXAM:    Neurological: Awake tracks, Aphasic, incoherent speech (w son ), Does not follow commands. Moves all extremities spontaneously anti gravity     Pulmonary: Clear     Cards S1 S2+     Gastrointestinal: Soft, Non-tender, Non-distended     LABS:                        12.5   12.70 )-----------( 233      ( 04 Jun 2025 05:14 )             38.0    06-05    144  |  109[H]  |  24[H]  ----------------------------<  108[H]  4.3   |  21[L]  |  0.79    Ca    8.8      05 Jun 2025 05:43          IMAGING:         MEDICATIONS:     cefTRIAXone   IVPB 1000 milliGRAM(s) IV Intermittent every 24 hours  acetaminophen     Tablet .. 650 milliGRAM(s) Oral every 6 hours PRN Temp greater or equal to 38C (100.4F), Mild Pain (1 - 3)  QUEtiapine 12.5 milliGRAM(s) Oral at bedtime PRN agitation  traMADol 25 milliGRAM(s) Oral every 6 hours PRN Moderate Pain (4 - 6)  doxazosin 8 milliGRAM(s) Oral at bedtime  enalapril 5 milliGRAM(s) Oral daily  metoprolol tartrate 25 milliGRAM(s) Oral two times a day  albuterol/ipratropium for Nebulization 3 milliLiter(s) Nebulizer every 8 hours  bisacodyl Suppository 10 milliGRAM(s) Rectal daily PRN Constipation  polyethylene glycol 3350 17 Gram(s) Oral two times a day  senna 2 Tablet(s) Oral at bedtime  atorvastatin 80 milliGRAM(s) Oral at bedtime  finasteride 5 milliGRAM(s) Oral daily  heparin   Injectable 5000 Unit(s) SubCutaneous every 8 hours  sodium chloride 0.45%. 1000 milliLiter(s) IV Continuous <Continuous>      DIET:

## 2025-06-06 LAB
ANION GAP SERPL CALC-SCNC: 11 MMOL/L — SIGNIFICANT CHANGE UP (ref 5–17)
BUN SERPL-MCNC: 21 MG/DL — SIGNIFICANT CHANGE UP (ref 7–23)
CALCIUM SERPL-MCNC: 8.7 MG/DL — SIGNIFICANT CHANGE UP (ref 8.4–10.5)
CHLORIDE SERPL-SCNC: 107 MMOL/L — SIGNIFICANT CHANGE UP (ref 96–108)
CO2 SERPL-SCNC: 22 MMOL/L — SIGNIFICANT CHANGE UP (ref 22–31)
CREAT SERPL-MCNC: 0.72 MG/DL — SIGNIFICANT CHANGE UP (ref 0.5–1.3)
CULTURE RESULTS: SIGNIFICANT CHANGE UP
CULTURE RESULTS: SIGNIFICANT CHANGE UP
EGFR: 93 ML/MIN/1.73M2 — SIGNIFICANT CHANGE UP
EGFR: 93 ML/MIN/1.73M2 — SIGNIFICANT CHANGE UP
GLUCOSE SERPL-MCNC: 126 MG/DL — HIGH (ref 70–99)
HCT VFR BLD CALC: 37.8 % — LOW (ref 39–50)
HGB BLD-MCNC: 12.7 G/DL — LOW (ref 13–17)
MCHC RBC-ENTMCNC: 31 PG — SIGNIFICANT CHANGE UP (ref 27–34)
MCHC RBC-ENTMCNC: 33.6 G/DL — SIGNIFICANT CHANGE UP (ref 32–36)
MCV RBC AUTO: 92.2 FL — SIGNIFICANT CHANGE UP (ref 80–100)
NRBC BLD AUTO-RTO: 0 /100 WBCS — SIGNIFICANT CHANGE UP (ref 0–0)
PLATELET # BLD AUTO: 270 K/UL — SIGNIFICANT CHANGE UP (ref 150–400)
POTASSIUM SERPL-MCNC: 3.6 MMOL/L — SIGNIFICANT CHANGE UP (ref 3.5–5.3)
POTASSIUM SERPL-SCNC: 3.6 MMOL/L — SIGNIFICANT CHANGE UP (ref 3.5–5.3)
RBC # BLD: 4.1 M/UL — LOW (ref 4.2–5.8)
RBC # FLD: 12.4 % — SIGNIFICANT CHANGE UP (ref 10.3–14.5)
SODIUM SERPL-SCNC: 140 MMOL/L — SIGNIFICANT CHANGE UP (ref 135–145)
SPECIMEN SOURCE: SIGNIFICANT CHANGE UP
SPECIMEN SOURCE: SIGNIFICANT CHANGE UP
WBC # BLD: 9.17 K/UL — SIGNIFICANT CHANGE UP (ref 3.8–10.5)
WBC # FLD AUTO: 9.17 K/UL — SIGNIFICANT CHANGE UP (ref 3.8–10.5)

## 2025-06-06 RX ADMIN — Medication 650 MILLIGRAM(S): at 05:13

## 2025-06-06 RX ADMIN — Medication 5 DROP(S): at 05:12

## 2025-06-06 RX ADMIN — HEPARIN SODIUM 5000 UNIT(S): 1000 INJECTION INTRAVENOUS; SUBCUTANEOUS at 05:13

## 2025-06-06 RX ADMIN — Medication 5 DROP(S): at 22:00

## 2025-06-06 RX ADMIN — METOPROLOL SUCCINATE 25 MILLIGRAM(S): 50 TABLET, EXTENDED RELEASE ORAL at 17:22

## 2025-06-06 RX ADMIN — ATORVASTATIN CALCIUM 80 MILLIGRAM(S): 80 TABLET, FILM COATED ORAL at 22:00

## 2025-06-06 RX ADMIN — IPRATROPIUM BROMIDE AND ALBUTEROL SULFATE 3 MILLILITER(S): .5; 2.5 SOLUTION RESPIRATORY (INHALATION) at 22:05

## 2025-06-06 RX ADMIN — HEPARIN SODIUM 5000 UNIT(S): 1000 INJECTION INTRAVENOUS; SUBCUTANEOUS at 14:48

## 2025-06-06 RX ADMIN — Medication 5 MILLIGRAM(S): at 17:23

## 2025-06-06 RX ADMIN — Medication 5 MILLIGRAM(S): at 05:13

## 2025-06-06 RX ADMIN — Medication 20 MILLIEQUIVALENT(S): at 14:48

## 2025-06-06 RX ADMIN — METOPROLOL SUCCINATE 25 MILLIGRAM(S): 50 TABLET, EXTENDED RELEASE ORAL at 05:13

## 2025-06-06 RX ADMIN — FINASTERIDE 5 MILLIGRAM(S): 1 TABLET, FILM COATED ORAL at 11:31

## 2025-06-06 RX ADMIN — HEPARIN SODIUM 5000 UNIT(S): 1000 INJECTION INTRAVENOUS; SUBCUTANEOUS at 22:00

## 2025-06-06 RX ADMIN — QUETIAPINE FUMARATE 12.5 MILLIGRAM(S): 25 TABLET ORAL at 22:00

## 2025-06-06 RX ADMIN — DOXAZOSIN MESYLATE 8 MILLIGRAM(S): 8 TABLET ORAL at 22:00

## 2025-06-06 RX ADMIN — IPRATROPIUM BROMIDE AND ALBUTEROL SULFATE 3 MILLILITER(S): .5; 2.5 SOLUTION RESPIRATORY (INHALATION) at 14:48

## 2025-06-06 RX ADMIN — IPRATROPIUM BROMIDE AND ALBUTEROL SULFATE 3 MILLILITER(S): .5; 2.5 SOLUTION RESPIRATORY (INHALATION) at 05:13
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
## 2025-06-06 NOTE — PROGRESS NOTE ADULT - SUBJECTIVE AND OBJECTIVE BOX
SUBJECTIVE:   More awake   OVERNIGHT EVENTS: none    Vital Signs Last 24 Hrs  T(C): 36.9 (06 Jun 2025 13:03), Max: 37 (05 Jun 2025 17:00)  T(F): 98.5 (06 Jun 2025 13:03), Max: 98.6 (05 Jun 2025 17:00)  HR: 87 (06 Jun 2025 13:03) (80 - 91)  BP: 130/69 (06 Jun 2025 13:03) (116/69 - 173/82)  BP(mean): --  RR: 20 (06 Jun 2025 13:03) (18 - 20)  SpO2: 97% (06 Jun 2025 13:03) (95% - 98%)    Parameters below as of 06 Jun 2025 13:03  Patient On (Oxygen Delivery Method): room air        PHYSICAL EXAM:    Neurological: Awake tracks, says "oh my God" w ADL,  Does not follow commands. Moves all extremities spontaneously anti gravity     Pulmonary: Clear     Cards S1 S2+     Gastrointestinal: Soft, Non-tender, Non-distended       LABS:                        12.7   9.17  )-----------( 270      ( 06 Jun 2025 11:24 )             37.8    06-06    140  |  107  |  21  ----------------------------<  126[H]  3.6   |  22  |  0.72    Ca    8.7      06 Jun 2025 11:24            IMAGING:         MEDICATIONS:    acetaminophen     Tablet .. 650 milliGRAM(s) Oral every 6 hours PRN Temp greater or equal to 38C (100.4F), Mild Pain (1 - 3)  QUEtiapine 12.5 milliGRAM(s) Oral at bedtime  doxazosin 8 milliGRAM(s) Oral at bedtime  enalapril 5 milliGRAM(s) Oral every 12 hours  metoprolol tartrate 25 milliGRAM(s) Oral two times a day  albuterol/ipratropium for Nebulization 3 milliLiter(s) Nebulizer every 8 hours  bisacodyl Suppository 10 milliGRAM(s) Rectal daily PRN Constipation  senna 2 Tablet(s) Oral at bedtime  acetic acid 2% Solution 5 Drop(s) Right Ear two times a day  atorvastatin 80 milliGRAM(s) Oral at bedtime  finasteride 5 milliGRAM(s) Oral daily  heparin   Injectable 5000 Unit(s) SubCutaneous every 8 hours      DIET:

## 2025-06-07 RX ORDER — FINASTERIDE 1 MG/1
5 TABLET, FILM COATED ORAL DAILY
Refills: 0 | Status: DISCONTINUED | OUTPATIENT
Start: 2025-06-07 | End: 2025-06-07

## 2025-06-07 RX ORDER — FINASTERIDE 1 MG/1
5 TABLET, FILM COATED ORAL DAILY
Refills: 0 | Status: DISCONTINUED | OUTPATIENT
Start: 2025-06-07 | End: 2025-06-10

## 2025-06-07 RX ADMIN — QUETIAPINE FUMARATE 12.5 MILLIGRAM(S): 25 TABLET ORAL at 21:02

## 2025-06-07 RX ADMIN — IPRATROPIUM BROMIDE AND ALBUTEROL SULFATE 3 MILLILITER(S): .5; 2.5 SOLUTION RESPIRATORY (INHALATION) at 06:01

## 2025-06-07 RX ADMIN — FINASTERIDE 5 MILLIGRAM(S): 1 TABLET, FILM COATED ORAL at 11:59

## 2025-06-07 RX ADMIN — Medication 5 DROP(S): at 06:01

## 2025-06-07 RX ADMIN — METOPROLOL SUCCINATE 25 MILLIGRAM(S): 50 TABLET, EXTENDED RELEASE ORAL at 06:01

## 2025-06-07 RX ADMIN — IPRATROPIUM BROMIDE AND ALBUTEROL SULFATE 3 MILLILITER(S): .5; 2.5 SOLUTION RESPIRATORY (INHALATION) at 14:03

## 2025-06-07 RX ADMIN — METOPROLOL SUCCINATE 25 MILLIGRAM(S): 50 TABLET, EXTENDED RELEASE ORAL at 18:05

## 2025-06-07 RX ADMIN — Medication 5 MILLIGRAM(S): at 18:05

## 2025-06-07 RX ADMIN — HEPARIN SODIUM 5000 UNIT(S): 1000 INJECTION INTRAVENOUS; SUBCUTANEOUS at 06:01

## 2025-06-07 RX ADMIN — ATORVASTATIN CALCIUM 80 MILLIGRAM(S): 80 TABLET, FILM COATED ORAL at 21:01

## 2025-06-07 RX ADMIN — IPRATROPIUM BROMIDE AND ALBUTEROL SULFATE 3 MILLILITER(S): .5; 2.5 SOLUTION RESPIRATORY (INHALATION) at 21:02

## 2025-06-07 RX ADMIN — Medication 5 DROP(S): at 18:06

## 2025-06-07 RX ADMIN — Medication 5 MILLIGRAM(S): at 06:01

## 2025-06-07 RX ADMIN — DOXAZOSIN MESYLATE 8 MILLIGRAM(S): 8 TABLET ORAL at 21:01

## 2025-06-07 RX ADMIN — HEPARIN SODIUM 5000 UNIT(S): 1000 INJECTION INTRAVENOUS; SUBCUTANEOUS at 21:01

## 2025-06-07 RX ADMIN — HEPARIN SODIUM 5000 UNIT(S): 1000 INJECTION INTRAVENOUS; SUBCUTANEOUS at 14:03
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
## 2025-06-08 LAB
CULTURE RESULTS: SIGNIFICANT CHANGE UP
CULTURE RESULTS: SIGNIFICANT CHANGE UP
SPECIMEN SOURCE: SIGNIFICANT CHANGE UP
SPECIMEN SOURCE: SIGNIFICANT CHANGE UP

## 2025-06-08 RX ADMIN — FINASTERIDE 5 MILLIGRAM(S): 1 TABLET, FILM COATED ORAL at 11:45

## 2025-06-08 RX ADMIN — ATORVASTATIN CALCIUM 80 MILLIGRAM(S): 80 TABLET, FILM COATED ORAL at 21:38

## 2025-06-08 RX ADMIN — HEPARIN SODIUM 5000 UNIT(S): 1000 INJECTION INTRAVENOUS; SUBCUTANEOUS at 05:38

## 2025-06-08 RX ADMIN — METOPROLOL SUCCINATE 25 MILLIGRAM(S): 50 TABLET, EXTENDED RELEASE ORAL at 17:24

## 2025-06-08 RX ADMIN — IPRATROPIUM BROMIDE AND ALBUTEROL SULFATE 3 MILLILITER(S): .5; 2.5 SOLUTION RESPIRATORY (INHALATION) at 21:37

## 2025-06-08 RX ADMIN — HEPARIN SODIUM 5000 UNIT(S): 1000 INJECTION INTRAVENOUS; SUBCUTANEOUS at 11:45

## 2025-06-08 RX ADMIN — IPRATROPIUM BROMIDE AND ALBUTEROL SULFATE 3 MILLILITER(S): .5; 2.5 SOLUTION RESPIRATORY (INHALATION) at 11:45

## 2025-06-08 RX ADMIN — Medication 5 MILLIGRAM(S): at 05:38

## 2025-06-08 RX ADMIN — Medication 650 MILLIGRAM(S): at 22:15

## 2025-06-08 RX ADMIN — Medication 5 DROP(S): at 17:24

## 2025-06-08 RX ADMIN — IPRATROPIUM BROMIDE AND ALBUTEROL SULFATE 3 MILLILITER(S): .5; 2.5 SOLUTION RESPIRATORY (INHALATION) at 05:39

## 2025-06-08 RX ADMIN — Medication 2 TABLET(S): at 21:37

## 2025-06-08 RX ADMIN — METOPROLOL SUCCINATE 25 MILLIGRAM(S): 50 TABLET, EXTENDED RELEASE ORAL at 05:38

## 2025-06-08 RX ADMIN — Medication 5 MILLIGRAM(S): at 17:32

## 2025-06-08 RX ADMIN — QUETIAPINE FUMARATE 12.5 MILLIGRAM(S): 25 TABLET ORAL at 21:37

## 2025-06-08 RX ADMIN — Medication 5 DROP(S): at 05:39

## 2025-06-08 RX ADMIN — DOXAZOSIN MESYLATE 8 MILLIGRAM(S): 8 TABLET ORAL at 21:37

## 2025-06-08 RX ADMIN — HEPARIN SODIUM 5000 UNIT(S): 1000 INJECTION INTRAVENOUS; SUBCUTANEOUS at 21:37
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
## 2025-06-09 ENCOUNTER — TRANSCRIPTION ENCOUNTER (OUTPATIENT)
Age: 80
End: 2025-06-09

## 2025-06-09 LAB
ANION GAP SERPL CALC-SCNC: 14 MMOL/L — SIGNIFICANT CHANGE UP (ref 5–17)
BUN SERPL-MCNC: 20 MG/DL — SIGNIFICANT CHANGE UP (ref 7–23)
CALCIUM SERPL-MCNC: 8.8 MG/DL — SIGNIFICANT CHANGE UP (ref 8.4–10.5)
CHLORIDE SERPL-SCNC: 108 MMOL/L — SIGNIFICANT CHANGE UP (ref 96–108)
CO2 SERPL-SCNC: 21 MMOL/L — LOW (ref 22–31)
CREAT SERPL-MCNC: 0.74 MG/DL — SIGNIFICANT CHANGE UP (ref 0.5–1.3)
EGFR: 92 ML/MIN/1.73M2 — SIGNIFICANT CHANGE UP
EGFR: 92 ML/MIN/1.73M2 — SIGNIFICANT CHANGE UP
GLUCOSE BLDC GLUCOMTR-MCNC: 106 MG/DL — HIGH (ref 70–99)
GLUCOSE BLDC GLUCOMTR-MCNC: 116 MG/DL — HIGH (ref 70–99)
GLUCOSE SERPL-MCNC: 120 MG/DL — HIGH (ref 70–99)
HCT VFR BLD CALC: 38.4 % — LOW (ref 39–50)
HGB BLD-MCNC: 12.8 G/DL — LOW (ref 13–17)
MCHC RBC-ENTMCNC: 30.8 PG — SIGNIFICANT CHANGE UP (ref 27–34)
MCHC RBC-ENTMCNC: 33.3 G/DL — SIGNIFICANT CHANGE UP (ref 32–36)
MCV RBC AUTO: 92.5 FL — SIGNIFICANT CHANGE UP (ref 80–100)
NRBC BLD AUTO-RTO: 0 /100 WBCS — SIGNIFICANT CHANGE UP (ref 0–0)
PLATELET # BLD AUTO: 335 K/UL — SIGNIFICANT CHANGE UP (ref 150–400)
POTASSIUM SERPL-MCNC: 3.8 MMOL/L — SIGNIFICANT CHANGE UP (ref 3.5–5.3)
POTASSIUM SERPL-SCNC: 3.8 MMOL/L — SIGNIFICANT CHANGE UP (ref 3.5–5.3)
RBC # BLD: 4.15 M/UL — LOW (ref 4.2–5.8)
RBC # FLD: 12.7 % — SIGNIFICANT CHANGE UP (ref 10.3–14.5)
SODIUM SERPL-SCNC: 143 MMOL/L — SIGNIFICANT CHANGE UP (ref 135–145)
WBC # BLD: 8.65 K/UL — SIGNIFICANT CHANGE UP (ref 3.8–10.5)
WBC # FLD AUTO: 8.65 K/UL — SIGNIFICANT CHANGE UP (ref 3.8–10.5)

## 2025-06-09 RX ORDER — HEPARIN SODIUM 1000 [USP'U]/ML
5000 INJECTION INTRAVENOUS; SUBCUTANEOUS
Qty: 0 | Refills: 0 | DISCHARGE
Start: 2025-06-09

## 2025-06-09 RX ORDER — QUETIAPINE FUMARATE 25 MG/1
12.5 TABLET ORAL
Qty: 0 | Refills: 0 | DISCHARGE
Start: 2025-06-09

## 2025-06-09 RX ORDER — ACETIC ACID 2 %
5 SOLUTION, NON-ORAL OTIC (EAR)
Qty: 0 | Refills: 0 | DISCHARGE
Start: 2025-06-09

## 2025-06-09 RX ORDER — CLOPIDOGREL BISULFATE 75 MG/1
1 TABLET, FILM COATED ORAL
Refills: 0 | DISCHARGE

## 2025-06-09 RX ORDER — BISACODYL 5 MG
1 TABLET, DELAYED RELEASE (ENTERIC COATED) ORAL
Qty: 0 | Refills: 0 | DISCHARGE
Start: 2025-06-09

## 2025-06-09 RX ORDER — ATORVASTATIN CALCIUM 80 MG/1
1 TABLET, FILM COATED ORAL
Refills: 0 | DISCHARGE

## 2025-06-09 RX ORDER — ATORVASTATIN CALCIUM 80 MG/1
1 TABLET, FILM COATED ORAL
Qty: 0 | Refills: 0 | DISCHARGE
Start: 2025-06-09

## 2025-06-09 RX ORDER — ACETAMINOPHEN 500 MG/5ML
2 LIQUID (ML) ORAL
Qty: 0 | Refills: 0 | DISCHARGE
Start: 2025-06-09

## 2025-06-09 RX ORDER — FINASTERIDE 1 MG/1
1 TABLET, FILM COATED ORAL
Qty: 0 | Refills: 0 | DISCHARGE
Start: 2025-06-09

## 2025-06-09 RX ORDER — SENNA 187 MG
2 TABLET ORAL
Qty: 0 | Refills: 0 | DISCHARGE
Start: 2025-06-09

## 2025-06-09 RX ADMIN — Medication 5 DROP(S): at 05:08

## 2025-06-09 RX ADMIN — Medication 2 TABLET(S): at 21:23

## 2025-06-09 RX ADMIN — Medication 5 MILLIGRAM(S): at 05:08

## 2025-06-09 RX ADMIN — HEPARIN SODIUM 5000 UNIT(S): 1000 INJECTION INTRAVENOUS; SUBCUTANEOUS at 05:08

## 2025-06-09 RX ADMIN — IPRATROPIUM BROMIDE AND ALBUTEROL SULFATE 3 MILLILITER(S): .5; 2.5 SOLUTION RESPIRATORY (INHALATION) at 21:24

## 2025-06-09 RX ADMIN — Medication 650 MILLIGRAM(S): at 18:43

## 2025-06-09 RX ADMIN — Medication 650 MILLIGRAM(S): at 17:23

## 2025-06-09 RX ADMIN — Medication 650 MILLIGRAM(S): at 01:14

## 2025-06-09 RX ADMIN — HEPARIN SODIUM 5000 UNIT(S): 1000 INJECTION INTRAVENOUS; SUBCUTANEOUS at 21:23

## 2025-06-09 RX ADMIN — IPRATROPIUM BROMIDE AND ALBUTEROL SULFATE 3 MILLILITER(S): .5; 2.5 SOLUTION RESPIRATORY (INHALATION) at 14:38

## 2025-06-09 RX ADMIN — METOPROLOL SUCCINATE 25 MILLIGRAM(S): 50 TABLET, EXTENDED RELEASE ORAL at 17:23

## 2025-06-09 RX ADMIN — Medication 650 MILLIGRAM(S): at 12:15

## 2025-06-09 RX ADMIN — FINASTERIDE 5 MILLIGRAM(S): 1 TABLET, FILM COATED ORAL at 11:47

## 2025-06-09 RX ADMIN — Medication 650 MILLIGRAM(S): at 11:47

## 2025-06-09 RX ADMIN — ATORVASTATIN CALCIUM 80 MILLIGRAM(S): 80 TABLET, FILM COATED ORAL at 21:24

## 2025-06-09 RX ADMIN — Medication 20 MILLIEQUIVALENT(S): at 11:48

## 2025-06-09 RX ADMIN — QUETIAPINE FUMARATE 12.5 MILLIGRAM(S): 25 TABLET ORAL at 21:20

## 2025-06-09 RX ADMIN — METOPROLOL SUCCINATE 25 MILLIGRAM(S): 50 TABLET, EXTENDED RELEASE ORAL at 05:08

## 2025-06-09 RX ADMIN — Medication 5 DROP(S): at 17:22

## 2025-06-09 RX ADMIN — IPRATROPIUM BROMIDE AND ALBUTEROL SULFATE 3 MILLILITER(S): .5; 2.5 SOLUTION RESPIRATORY (INHALATION) at 05:08

## 2025-06-09 RX ADMIN — DOXAZOSIN MESYLATE 8 MILLIGRAM(S): 8 TABLET ORAL at 21:20

## 2025-06-09 RX ADMIN — HEPARIN SODIUM 5000 UNIT(S): 1000 INJECTION INTRAVENOUS; SUBCUTANEOUS at 14:36

## 2025-06-09 NOTE — DISCHARGE NOTE PROVIDER - NSDCFUADDINST_GEN_ALL_CORE_FT
You can shower. Please do not engage in strenuous activity, heavy lifting, drive, or return to work or school until cleared by surgeon.  Please notify physician if fevers, bleeding, swelling, pain not relieved by medication, increased sluggishness or irritability, increased nausea or vomiting, inability to tolerate foods or liquids.  No heavy lifting/straining, Showering allowed, Stairs allowed, Walking - Indoors allowed, Walking - Outdoors allowed, Follow Instructions Provided by your Surgical Team  Do not start any Motrin /Aspirin NSAIDS( Motrin, Advil.... until cleared by your neurosurgeon

## 2025-06-09 NOTE — DISCHARGE NOTE PROVIDER - NSDCFUADDAPPT_GEN_ALL_CORE_FT
Please see Dr Killian after dis charge from rehab for follow up  Please make an appointment to see Dr Kathleen for your Left lower extremity blood clot after discharge from rehab Please see Dr Killian after dis charge from rehab for follow up  Please make an appointment to see Dr Kathleen for your Left lower extremity blood clot after discharge from rehab  Re Otitis externa, Patient should follow up in ENT office as an outpatient. May see Dr. Lee, Dr. Saini, Dr. Velazquez, Dr. Wiseman. Call [phone].
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
## 2025-06-09 NOTE — DISCHARGE NOTE PROVIDER - CARE PROVIDERS DIRECT ADDRESSES
[email or web address]

## 2025-06-09 NOTE — DISCHARGE NOTE PROVIDER - CARE PROVIDER_API CALL
Jameson Killian  Neurosurgery  [address], [address]  [place] [address]  Phone: [phone]  Fax: [phone]  Follow Up Time:     Dallin Kathleen  Vascular Medicine  [address], [address] [place] [id number]  Phone: [phone]  Fax: [phone]  Follow Up Time:

## 2025-06-09 NOTE — DISCHARGE NOTE PROVIDER - NSDCCPCAREPLAN_GEN_ALL_CORE_FT
PRINCIPAL DISCHARGE DIAGNOSIS  Diagnosis: Subarachnoid hemorrhage  Assessment and Plan of Treatment: You were admitted with a traumatic subarachnoid hemorrhage, your condition is stable for acute rehab placement. Please see Dr Killian once discharge from rehab      SECONDARY DISCHARGE DIAGNOSES  Diagnosis: Fungal otitis externa  Assessment and Plan of Treatment: Continue otic drops and follow up with your primary care provider    Diagnosis: Acute deep vein thrombosis (DVT)  Assessment and Plan of Treatment: Continue Heparin SQ and follow up with Vascular cardiology Dr Kathleen once discharge from rehab    Diagnosis: CAD (coronary artery disease)  Assessment and Plan of Treatment: Hold Plavix until you see Dr Killian, continue Metoprolol and Atorvastatin    Diagnosis: Emphysema/COPD  Assessment and Plan of Treatment: Continue your home medications and follow up with your primary care provider    Diagnosis: Urinary retention  Assessment and Plan of Treatment: Continue your home medications and follow up with your primary care provider     PRINCIPAL DISCHARGE DIAGNOSIS  Diagnosis: Subarachnoid hemorrhage  Assessment and Plan of Treatment: You were admitted with a traumatic subarachnoid hemorrhage, your condition is stable for acute rehab placement. Please see Dr Killian once discharge from rehab      SECONDARY DISCHARGE DIAGNOSES  Diagnosis: Fungal otitis externa  Assessment and Plan of Treatment: Continue otic drops and follow up with your primary care provider    Diagnosis: Acute deep vein thrombosis (DVT)  Assessment and Plan of Treatment: Continue Heparin SQ and follow up with Vascular cardiology Dr Kathleen once discharge from rehab    Diagnosis: CAD (coronary artery disease)  Assessment and Plan of Treatment: Hold Plavix until you see Dr Killian, continue Metoprolol and Atorvastatin    Diagnosis: Emphysema/COPD  Assessment and Plan of Treatment: Continue your home medications and follow up with your primary care provider    Diagnosis: Urinary retention  Assessment and Plan of Treatment: Continue your home medications and follow up with your primary care provider    Diagnosis: Hypertension  Assessment and Plan of Treatment: Continue Enalapril and follow up with your primary care provider    Diagnosis: Hyperlipemia  Assessment and Plan of Treatment: Continue Atorvastatin and follow up with your primary care provider     PRINCIPAL DISCHARGE DIAGNOSIS  Diagnosis: Subarachnoid hemorrhage  Assessment and Plan of Treatment: You were admitted with a traumatic subarachnoid hemorrhage, your condition is stable for acute rehab placement. Please see Dr Killian once discharge from rehab      SECONDARY DISCHARGE DIAGNOSES  Diagnosis: Fungal otitis externa  Assessment and Plan of Treatment: Continue otic drops and follow up with your primary care provider    Diagnosis: Acute deep vein thrombosis (DVT)  Assessment and Plan of Treatment: Continue Heparin SQ and follow up with Vascular cardiology Dr Kathleen once discharge from rehab    Diagnosis: CAD (coronary artery disease)  Assessment and Plan of Treatment: Hold Plavix until you see Dr Killian, continue Metoprolol and Atorvastatin    Diagnosis: Emphysema/COPD  Assessment and Plan of Treatment: Continue your home medications and follow up with your primary care provider    Diagnosis: Urinary retention  Assessment and Plan of Treatment: Continue your home medications and follow up with your primary care provider    Diagnosis: Hypertension  Assessment and Plan of Treatment: Continue Enalapril and Metoprolol, follow up with your primary care provider    Diagnosis: Hyperlipemia  Assessment and Plan of Treatment: Continue Atorvastatin and follow up with your primary care provider    Diagnosis: H/O acute otitis externa  Assessment and Plan of Treatment: Patient should follow up in ENT office as an outpatient. May see Dr. Lee, Dr. Saini, Dr. Velazquez, Dr. Wiseman. Call [phone].

## 2025-06-09 NOTE — DISCHARGE NOTE NURSING/CASE MANAGEMENT/SOCIAL WORK - NSDCPEEMAIL_GEN_ALL_CORE
[facility] email [email or web address]

## 2025-06-09 NOTE — DISCHARGE NOTE PROVIDER - NSDCMRMEDTOKEN_GEN_ALL_CORE_FT
acetaminophen 325 mg oral tablet: 2 tab(s) orally every 6 hours As needed Temp greater or equal to 38C (100.4F), Mild Pain (1 - 3)  acetic acid 2% otic solution: 5 drop(s) to each affected ear 2 times a day  alfuzosin 10 mg oral tablet, extended release: 1 tab(s) orally once a day  atorvastatin 80 mg oral tablet: 1 tab(s) orally once a day (at bedtime)  bisacodyl 10 mg rectal suppository: 1 suppository(ies) rectal once a day As needed Constipation  clonazePAM 0.5 mg oral tablet: 1 tab(s) orally once a day as needed for  anxiety  enalapril 5 mg oral tablet: 1 tab(s) orally once a day  finasteride 5 mg oral tablet: 1 tab(s) orally once a day  heparin: 5,000 unit(s) subcutaneous every 8 hours  metoprolol succinate 50 mg oral tablet, extended release: 1 tab(s) orally once a day  QUEtiapine: 12.5 milligram(s) orally once a day (at bedtime)  senna leaf extract oral tablet: 2 tab(s) orally once a day (at bedtime)  umeclidinium-vilanterol 62.5 mcg-25 mcg/inh inhalation powder: 1 puff(s) inhaled once a day   acetaminophen 325 mg oral tablet: 2 tab(s) orally every 6 hours As needed Temp greater or equal to 38C (100.4F), Mild Pain (1 - 3)  acetic acid 2% otic solution: 5 drop(s) to each affected ear 2 times a day  alfuzosin 10 mg oral tablet, extended release: 1 tab(s) orally once a day  atorvastatin 80 mg oral tablet: 1 tab(s) orally once a day (at bedtime)  bisacodyl 10 mg rectal suppository: 1 suppository(ies) rectal once a day As needed Constipation  clonazePAM 0.5 mg oral tablet: 1 tab(s) orally once a day as needed for  anxiety  enalapril 5 mg oral tablet: 1 tab(s) orally once a day  finasteride 5 mg oral tablet: 1 tab(s) orally once a day  heparin: 5,000 unit(s) subcutaneous every 8 hours  metoprolol succinate 50 mg oral tablet, extended release: 1 tab(s) orally once a day  oxyCODONE 10 mg oral tablet: 1 tab(s) orally every 4 hours As needed Severe Pain (7 - 10)  oxyCODONE 5 mg oral tablet: 1 tab(s) orally every 4 hours As needed Moderate Pain (4 - 6)  QUEtiapine: 12.5 milligram(s) orally once a day (at bedtime)  senna leaf extract oral tablet: 2 tab(s) orally once a day (at bedtime)  umeclidinium-vilanterol 62.5 mcg-25 mcg/inh inhalation powder: 1 puff(s) inhaled once a day

## 2025-06-09 NOTE — DISCHARGE NOTE NURSING/CASE MANAGEMENT/SOCIAL WORK - PATIENT PORTAL LINK FT
You can access the FollowMyHealth Patient Portal offered by [facility] by registering at the following website: [email or web address]. By joining [facility]’s FollowMyHealth portal, you will also be able to view your health information using other applications (apps) compatible with our system.
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
## 2025-06-09 NOTE — DISCHARGE NOTE NURSING/CASE MANAGEMENT/SOCIAL WORK - NSDCFUADDAPPT_GEN_ALL_CORE_FT
Please see Dr Killian after dis charge from rehab for follow up  Please make an appointment to see Dr Kathleen for your Left lower extremity blood clot after discharge from rehab  Re Otitis externa, Patient should follow up in ENT office as an outpatient. May see Dr. Lee, Dr. Saini, Dr. Velazquez, Dr. Wiseman. Call [phone].
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
## 2025-06-09 NOTE — DISCHARGE NOTE NURSING/CASE MANAGEMENT/SOCIAL WORK - NSDCPEWEB_GEN_ALL_CORE
[facility] for Tobacco Control website --- [email or web address] --- [email or web address]

## 2025-06-09 NOTE — DISCHARGE NOTE NURSING/CASE MANAGEMENT/SOCIAL WORK - FINANCIAL ASSISTANCE
[facility] provides services at a reduced cost to those who are determined to be eligible through [facility]’s financial assistance program. Information regarding [facility]’s financial assistance program can be found by going to [email or web address] or by calling [phone].
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
## 2025-06-10 VITALS — HEART RATE: 71 BPM | SYSTOLIC BLOOD PRESSURE: 121 MMHG | TEMPERATURE: 98 F | DIASTOLIC BLOOD PRESSURE: 67 MMHG

## 2025-06-10 LAB — SARS-COV-2 RNA SPEC QL NAA+PROBE: SIGNIFICANT CHANGE UP

## 2025-06-10 RX ORDER — OXYCODONE HYDROCHLORIDE 30 MG/1
1 TABLET ORAL
Qty: 0 | Refills: 0 | DISCHARGE
Start: 2025-06-10

## 2025-06-10 RX ORDER — OXYCODONE HYDROCHLORIDE 30 MG/1
10 TABLET ORAL EVERY 4 HOURS
Refills: 0 | Status: DISCONTINUED | OUTPATIENT
Start: 2025-06-10 | End: 2025-06-10

## 2025-06-10 RX ORDER — OXYCODONE HYDROCHLORIDE 30 MG/1
5 TABLET ORAL EVERY 4 HOURS
Refills: 0 | Status: DISCONTINUED | OUTPATIENT
Start: 2025-06-10 | End: 2025-06-10

## 2025-06-10 RX ADMIN — HEPARIN SODIUM 5000 UNIT(S): 1000 INJECTION INTRAVENOUS; SUBCUTANEOUS at 12:53

## 2025-06-10 RX ADMIN — HEPARIN SODIUM 5000 UNIT(S): 1000 INJECTION INTRAVENOUS; SUBCUTANEOUS at 05:25

## 2025-06-10 RX ADMIN — FINASTERIDE 5 MILLIGRAM(S): 1 TABLET, FILM COATED ORAL at 12:55

## 2025-06-10 RX ADMIN — METOPROLOL SUCCINATE 25 MILLIGRAM(S): 50 TABLET, EXTENDED RELEASE ORAL at 05:25

## 2025-06-10 RX ADMIN — Medication 650 MILLIGRAM(S): at 05:23

## 2025-06-10 RX ADMIN — IPRATROPIUM BROMIDE AND ALBUTEROL SULFATE 3 MILLILITER(S): .5; 2.5 SOLUTION RESPIRATORY (INHALATION) at 05:23

## 2025-06-10 RX ADMIN — Medication 5 DROP(S): at 05:25

## 2025-06-10 RX ADMIN — IPRATROPIUM BROMIDE AND ALBUTEROL SULFATE 3 MILLILITER(S): .5; 2.5 SOLUTION RESPIRATORY (INHALATION) at 12:53

## 2025-06-10 RX ADMIN — Medication 650 MILLIGRAM(S): at 05:53

## 2025-06-10 RX ADMIN — OXYCODONE HYDROCHLORIDE 5 MILLIGRAM(S): 30 TABLET ORAL at 11:48

## 2025-06-10 RX ADMIN — Medication 5 MILLIGRAM(S): at 05:24
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
## 2025-06-10 NOTE — PROGRESS NOTE ADULT - PROBLEM SELECTOR PLAN 3
L below the knee DVT. No PE on CTPA    Vascular Cardiology following- Continue Heparin subcutaneous 5000 units every 8 hours. LE surveillance venous duplex on 6/8 showed no propagation of L soleal DVT. Recommend repeat surveillance venous duplex on  6/13.
L soleal  vascular cardio eval appreciated. c/w DVT ppx, rpt LE duplex 6/9

## 2025-06-10 NOTE — PROGRESS NOTE ADULT - THIS PATIENT HAS THE FOLLOWING CONDITION(S)/DIAGNOSES ON THIS ADMISSION:
Cerebral Edema/Acute Respiratory Failure
Encephalopathy
Encephalopathy
None
Encephalopathy
Encephalopathy
None
Brain Compression / Herniation/Acute Respiratory Failure
None
None/Encephalopathy
None
Encephalopathy
No
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
## 2025-06-10 NOTE — PROGRESS NOTE ADULT - PROBLEM SELECTOR PLAN 4
CTA w/ emphysema  Goal oxygenation > 88%  c/w duoneb while inpatient.
CTA w/ emphysema  Goal oxygenation > 88%  c/w duoneb
CTA w/ emphysema  Goal oxygenation > 88%  c/w duoneb while inpatient.
CTA w/ emphysema  Goal oxygenation > 88%  c/w duoneb while inpatient.

## 2025-06-10 NOTE — PROGRESS NOTE ADULT - PROBLEM SELECTOR PLAN 5
UBALDO Simental    Last visit was 4/30/25, note reviewed. Patient had self discontinued statin for muscle cramps but was advised to resume. Medications were Plavix 75, enalapril 5, metoprolol succinate 50mg daily, atorvastatin 20mg daily    s/p PCI to D1 1 stent in 2011. Was recommended for TTE + stress for SOB. Can be considered as outpatient when would be cleared for DAPT    HTN- increased enalapril to 10mg
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
## 2025-06-10 NOTE — PROGRESS NOTE ADULT - PROBLEM SELECTOR PLAN 8
on hep subq  Last BM 6/2    Splenic lesion - was recommended for MRI abd. H/H has been stable, can obtain as an outpatient. Discussed finding with son at bedside.     Called [facility] pharmacy [phone] [place] for med rec: Filled alfuzosin in feb, but saw urologist and was rx silodosin 8mg (didn't  yet though).
on hep subq  Last BM 6/2    Splenic lesion - was recommended for MRI abd. H/H has been stable, can obtain as an outpatient. Discussed finding with son at bedside 6/5.     Called [facility] pharmacy [phone] [place] for med rec: Filled alfuzosin in feb, but saw urologist and was rx silodosin 8mg (didn't  yet though). Continue doxazosin inpatient for BPH     ISTOP: Reference #: [id number]  PDI	Current Rx	Drug Type	Rx Written	Rx Dispensed	Drug	Quantity	Days Supply	Prescriber Name	Prescriber USHA #	Payment Method  A	N	B	04/02/2025	04/03/2025	clonazepam 0.5 mg tablet	30	30	Rafiq Sal	[id number]	Medicare
on hep subq    Splenic lesion - was recommended for MRI abd. H/H has been stable, can obtain as an outpatient. Discussed finding with son at bedside 6/5.     Called [facility] pharmacy [phone] [place] for med rec: Filled alfuzosin in feb, but saw urologist and was rx silodosin 8mg (didn't  yet though). Continue doxazosin inpatient for BPH     ISTOP: Reference #: [id number]  PDI	Current Rx	Drug Type	Rx Written	Rx Dispensed	Drug	Quantity	Days Supply	Prescriber Name	Prescriber USHA #	Payment Method  A	N	B	04/02/2025	04/03/2025	clonazepam 0.5 mg tablet	30	30	Rafiq Sal	[id number]	Medicare
on hep subq  Last BM 6/2    Splenic lesion - was recommended for MRI abd

## 2025-06-10 NOTE — PROGRESS NOTE ADULT - SUBJECTIVE AND OBJECTIVE BOX
Vascular Cardiology Progress Note    DIRECT PROVIDER NUMBER: [phone] / Available on TEAMS    CC:  TSAH    Interval Events:   Remains on Heparin Subcutaneous 5000 units TID.   Stable mild LE edema.   LE venous duplex on 6/8 showed no change to L soleal DVT.     MEDICATIONS  (STANDING):  acetic acid 2% Solution 5 Drop(s) Right Ear two times a day  albuterol/ipratropium for Nebulization 3 milliLiter(s) Nebulizer every 8 hours  atorvastatin 80 milliGRAM(s) Oral at bedtime  doxazosin 8 milliGRAM(s) Oral at bedtime  enalapril 5 milliGRAM(s) Oral every 12 hours  finasteride 5 milliGRAM(s) Oral daily  heparin   Injectable 5000 Unit(s) SubCutaneous every 8 hours  metoprolol tartrate 25 milliGRAM(s) Oral two times a day  QUEtiapine 12.5 milliGRAM(s) Oral at bedtime  senna 2 Tablet(s) Oral at bedtime      PAST MEDICAL & SURGICAL HISTORY: see HPI    FAMILY HISTORY: see HPI    SOCIAL HISTORY:  unchanged    REVIEW OF SYSTEMS:  [X] Unable to obtain    PHYSICAL EXAM:  Vital Signs Last 24 Hrs  T(C): 36.3 (10 Trev 2025 08:00), Max: 36.8 (10 Trev 2025 05:09)  T(F): 97.4 (10 Trev 2025 08:00), Max: 98.3 (10 Trev 2025 05:09)  HR: 67 (10 Trev 2025 08:00) (67 - 98)  BP: 111/71 (10 Trev 2025 08:00) (101/69 - 144/70)  BP(mean): --  RR: 18 (10 Trev 2025 08:00) (18 - 20)  SpO2: 96% (10 Trev 2025 08:00) (96% - 98%)    Parameters below as of 10 Trev 2025 05:09  Patient On (Oxygen Delivery Method): room air    Appearance:  NAD	  Cardiovascular: RRR, S1 and S2  Respiratory: CTA B/L  Gastrointestinal:  Soft, Non-tender, + BS	  Skin: No cyanosis	  Extremities: Mild LE edema, bilateral calves soft   Foot Exam: No tissue loss    LABS:	 	                        12.8   8.65  )-----------( 335      ( 09 Jun 2025 06:31 )             38.4     06-09    143  |  108  |  20  ----------------------------<  120[H]  3.8   |  21[L]  |  0.74    Ca    8.8      09 Jun 2025 06:33    Urinalysis Basic - ( 09 Jun 2025 06:33 )    Color: x / Appearance: x / SG: x / pH: x  Gluc: 120 mg/dL / Ketone: x  / Bili: x / Urobili: x   Blood: x / Protein: x / Nitrite: x   Leuk Esterase: x / RBC: x / WBC x   Sq Epi: x / Non Sq Epi: x / Bacteria: x      Assessment:  1. L below the knee DVT  2. No PE on CTPA  3. CAD with PCI  4. HTN  5. SDH    Plan:  1. Continue Heparin subcutaneous 5000 units every 8 hours.  2. LE surveillance venous duplex on 6/8 showed no propagation of L soleal DVT.  3. Recommend repeat surveillance venous duplex on  6/13.  4. No PE noted on CTA chest.   5. Pneumatic compression to R lower extremity.   6. Appreciate excellent Neurosurgical care.       Thank you  KP Rangel, MS, [facility]  Vascular Cardiology Service    Please call with any questions:   DIRECT SERVICE NUMBER: [phone] / Available on TEAMS

## 2025-06-10 NOTE — PROGRESS NOTE ADULT - TIME BILLING
Review of tests, imaging, labs, documents, medical management, coordination of care and counseling.
Reviewing the patient's electronic medical record, examining the patient, discussing the management plan.
Review of tests, imaging, labs, documents, medical management, coordination of care and counseling.
Reviewing the patient's electronic medical record, examining the patient, discussing the management plan.
Review of tests, imaging, labs, documents, medical management, coordination of care and counseling.
Review of tests, imaging, labs, documents, medical management, coordination of care and counseling.

## 2025-06-10 NOTE — PROGRESS NOTE ADULT - PROVIDER SPECIALTY LIST ADULT
NSICU
NSICU
Neurosurgery
Neurosurgery
Rehab Medicine
Trauma Surgery
NSICU
Neurosurgery
Orthopedics
Vascular Cardiology
Vascular Cardiology
NSICU
NSICU
Neurosurgery
Vascular Cardiology
Vascular Cardiology
NSICU
Neurosurgery
Hospitalist

## 2025-06-10 NOTE — PROGRESS NOTE ADULT - PROBLEM SELECTOR PLAN 6
Passed S/S, tolerating diet well
Passed S/S, tolerating diet well
NGT replaced by ENT    If mentation improves, can consider S/S eval but likely will need PEG discussion w/ family.
Passed S/S, tolerating diet

## 2025-06-10 NOTE — PROGRESS NOTE ADULT - PROBLEM SELECTOR PLAN 1
With contusions /SDH  Management per neurosurgery  On tramadol for pain   More alert but not following commands
With contusions /SDH  Management per neurosurgery  On tramadol for pain   More alert but not following commands
With contusions /SDH  Management per neurosurgery  On tramadol for pain   More alert today but not following commands
- 5/29 CTH: stable heme  - 6/1 CT Cervical: no acute fracture  - 5/26 CT Max: Right temporal bone fracture crosses inferior to the otic capsule into the sphenoid bone. Middle and inner ear structures appear to remain intact. Patient motion artifact somewhat limits this evaluation. Repeat evaluation to the skull base may be considered once   tolerated by the patient, noting sphenoid sinus fluid collections  - Pain control w/ Tylenol prn  - Seroquel for agitation  - Off AEDS since 6/2

## 2025-06-10 NOTE — PROGRESS NOTE ADULT - PROBLEM SELECTOR PLAN 2
Afebrile, prior infectious w/u neg  CTA - no PE. Some emphysema and atelectasis   UA positive, UCX contaminated and bcx NTD  Cover empirically w/ ceftriaxone x 3 days until 6/5  wbc improved today
Afebrile, prior infectious w/u neg  CTA - no PE. Some emphysema and atelectasis   UA positive, UCX contaminated and bcx NTD  Cover empirically w/ ceftriaxone x 3 days until 6/5  wbc improved
Afebrile, prior infectious w/u neg  CTA - no PE. Some emphysema and atelectasis   UA positive, UCX + BCX pending   Cover empirically w/ ceftriaxone for now pending ucx  wbc improving
Afebrile, prior infectious w/u neg  CTA - no PE. Some emphysema and atelectasis   UA positive, UCX contaminated and bcx NTD  Covered empirically w/ ceftriaxone x 3 days until 6/5  wbc improved

## 2025-06-10 NOTE — PROGRESS NOTE ADULT - PROBLEM SELECTOR PLAN 7
ENT saw, rec acetic acid ear drops, 5 drops BID to right ear  x14 days.
ENT saw, rec acetic acid ear drops, 5 drops BID to right ear  x14 days
ENT saw, rec acetic acid ear drops, 5 drops BID to right ear  x14 days - called pharmacy to order
ENT saw, rec acetic acid ear drops, 5 drops BID to right ear  x14 days - ordered

## 2025-06-12 ENCOUNTER — APPOINTMENT (OUTPATIENT)
Dept: CARDIOLOGY | Facility: CLINIC | Age: 80
End: 2025-06-12

## 2025-06-18 ENCOUNTER — NON-APPOINTMENT (OUTPATIENT)
Age: 80
End: 2025-06-18

## 2025-06-18 PROBLEM — S06.9XAD TRAUMATIC BRAIN INJURY, WITH UNKNOWN LOSS OF CONSCIOUSNESS STATUS, SUBSEQUENT ENCOUNTER: Status: ACTIVE | Noted: 2025-06-18

## 2025-06-27 ENCOUNTER — OUTPATIENT (OUTPATIENT)
Dept: OUTPATIENT SERVICES | Facility: HOSPITAL | Age: 80
LOS: 1 days | End: 2025-06-27
Payer: COMMERCIAL

## 2025-06-27 ENCOUNTER — RESULT REVIEW (OUTPATIENT)
Age: 80
End: 2025-06-27

## 2025-06-27 ENCOUNTER — APPOINTMENT (OUTPATIENT)
Dept: CT IMAGING | Facility: IMAGING CENTER | Age: 80
End: 2025-06-27

## 2025-06-27 DIAGNOSIS — S06.9XAD UNSPECIFIED INTRACRANIAL INJURY WITH LOSS OF CONSCIOUSNESS STATUS UNKNOWN, SUBSEQUENT ENCOUNTER: ICD-10-CM

## 2025-06-27 PROCEDURE — 70450 CT HEAD/BRAIN W/O DYE: CPT

## 2025-06-30 ENCOUNTER — NON-APPOINTMENT (OUTPATIENT)
Age: 80
End: 2025-06-30

## 2025-06-30 ENCOUNTER — APPOINTMENT (OUTPATIENT)
Dept: NEUROSURGERY | Facility: CLINIC | Age: 80
End: 2025-06-30
Payer: MEDICARE

## 2025-06-30 VITALS
OXYGEN SATURATION: 98 % | SYSTOLIC BLOOD PRESSURE: 118 MMHG | DIASTOLIC BLOOD PRESSURE: 63 MMHG | WEIGHT: 174 LBS | HEART RATE: 82 BPM

## 2025-06-30 VITALS — BODY MASS INDEX: 28.08 KG/M2 | HEIGHT: 66 IN

## 2025-06-30 PROCEDURE — 99205 OFFICE O/P NEW HI 60 MIN: CPT

## 2025-07-15 ENCOUNTER — APPOINTMENT (OUTPATIENT)
Dept: ULTRASOUND IMAGING | Facility: CLINIC | Age: 80
End: 2025-07-15
Payer: MEDICARE

## 2025-07-15 PROCEDURE — 93970 EXTREMITY STUDY: CPT

## 2025-08-07 ENCOUNTER — APPOINTMENT (OUTPATIENT)
Dept: CARDIOLOGY | Facility: CLINIC | Age: 80
End: 2025-08-07

## 2025-08-25 ENCOUNTER — APPOINTMENT (OUTPATIENT)
Dept: MRI IMAGING | Facility: CLINIC | Age: 80
End: 2025-08-25